# Patient Record
Sex: MALE | Race: WHITE | NOT HISPANIC OR LATINO | Employment: UNEMPLOYED | URBAN - METROPOLITAN AREA
[De-identification: names, ages, dates, MRNs, and addresses within clinical notes are randomized per-mention and may not be internally consistent; named-entity substitution may affect disease eponyms.]

---

## 2023-11-10 ENCOUNTER — CLINICAL SUPPORT (OUTPATIENT)
Dept: SLEEP MEDICINE | Facility: HOSPITAL | Age: 28
End: 2023-11-10
Payer: COMMERCIAL

## 2023-11-10 DIAGNOSIS — G47.33 OBSTRUCTIVE SLEEP APNEA (ADULT) (PEDIATRIC): ICD-10-CM

## 2023-11-10 DIAGNOSIS — R06.83 SNORING: ICD-10-CM

## 2023-11-10 PROCEDURE — 95806 SLEEP STUDY UNATT&RESP EFFT: CPT | Performed by: INTERNAL MEDICINE

## 2023-11-13 NOTE — PROGRESS NOTES
The patient received equipment and instructions for use of the [facility] Nomad HSAT device. The patient was instructed how to apply the effort belts, cannula, thermistor. It was also explained how the Nomad and oximeter components work.  The patient was asked to record their sleep for an 8-hour period.     The patient was informed of their responsibility for the device and acknowledged this by signing the HSAT device contract. The patient was asked to return the device on 11/13/2023 between the hours of 8am and Noon to the [facility] .     The patient was instructed to call 911 as usual for any medical- emergencies while at home.  The patient was also given a phone number for troubleshooting when using the device in case there were additional questions.

## 2024-02-07 ENCOUNTER — OFFICE VISIT (OUTPATIENT)
Dept: SLEEP MEDICINE | Facility: CLINIC | Age: 29
End: 2024-02-07
Payer: COMMERCIAL

## 2024-02-07 ENCOUNTER — APPOINTMENT (OUTPATIENT)
Dept: SLEEP MEDICINE | Facility: CLINIC | Age: 29
End: 2024-02-07
Payer: COMMERCIAL

## 2024-02-07 VITALS
HEART RATE: 115 BPM | WEIGHT: 160 LBS | BODY MASS INDEX: 25.11 KG/M2 | OXYGEN SATURATION: 95 % | DIASTOLIC BLOOD PRESSURE: 68 MMHG | SYSTOLIC BLOOD PRESSURE: 134 MMHG | HEIGHT: 67 IN

## 2024-02-07 DIAGNOSIS — G47.33 OBSTRUCTIVE SLEEP APNEA (ADULT) (PEDIATRIC): Primary | ICD-10-CM

## 2024-02-07 DIAGNOSIS — F20.0 PARANOID SCHIZOPHRENIA (MULTI): ICD-10-CM

## 2024-02-07 DIAGNOSIS — F17.200 TOBACCO USE DISORDER: ICD-10-CM

## 2024-02-07 DIAGNOSIS — J31.0 CHRONIC RHINITIS: ICD-10-CM

## 2024-02-07 DIAGNOSIS — J35.1 TONSILLAR HYPERTROPHY: ICD-10-CM

## 2024-02-07 PROCEDURE — 99214 OFFICE O/P EST MOD 30 MIN: CPT | Performed by: INTERNAL MEDICINE

## 2024-02-07 RX ORDER — FLUOXETINE HYDROCHLORIDE 20 MG/1
20 CAPSULE ORAL NIGHTLY
COMMUNITY

## 2024-02-07 RX ORDER — ARIPIPRAZOLE 960 MG/3.2ML
INJECTION, SUSPENSION, EXTENDED RELEASE INTRAMUSCULAR
COMMUNITY

## 2024-02-07 ASSESSMENT — PAIN SCALES - GENERAL: PAINLEVEL: 0-NO PAIN

## 2024-02-07 NOTE — PATIENT INSTRUCTIONS
Call  the  Sleep Center ([phone]) to speak with a sleep testing center  to book your overnight sleep study procedure at one of our adult and pediatric-friendly sleep labs. Overnight sleep studies may be scheduled on a weekday or weekend.      We have child-life services on a case-by-case basis at the [facility] location. We also perform daytime testing for shift workers on a case by case basis.     For the study: Bring usual medications and nightly routine items for your sleep study.  You may wish to bring a snack and nightly routine items you feel would be important to help you sleep (e.g. favorite pillow). Bring your sleep logs/requested paperwork to your sleep study appointment.     Results of your sleep study will be given to the ordering clinician. Please contact their office for results or followup as directed by your clinician. For additional information about the sleep medicine services, please call [phone].     Locations for sleep studies are [facility] ([facility]), [facility], [place], [place], [place], [place], [place], and [place].

## 2024-02-07 NOTE — ASSESSMENT & PLAN NOTE
History of severe YULIYA with severe hypoxia  Recommend in lab titration    We will follow-up with management options once testing is completed  Jhonatan verbalized understanding  Recommended follow-up 3-4 weeks after testing to discuss results and treatment options

## 2024-02-07 NOTE — PROGRESS NOTES
Subjective   Patient ID: Jhonatan Huffman is a 28 y.o. male who presents for a sleep evaluation with concerns of Review Sleep Study Results.  HPI     Prior sleep study results: 11/10/2023: HSAT: AHI 3% 86, AHI 4% 82, JACINTO 3.9.  There is no significant reduction with fine sleep.  SpO2 edy 62%.  Oxygen saturation less than 88% for 193 minutes severe hypoxia.  Recommendation for in lab titration     Sleep schedule  on weekdays / work days:  Usual Bedtime 10 PM  Falls asleep around 10 PM  Wake time 10 AM  Total sleep time average is 12 hours/day  Naps yes for 30 minutes to 1 hour    Sleep schedule  on weekends/non work days : Same    Preferred sleeping position: side lying    Review of Systems    snoring, witnessed apneas, nocturnal awakenings, waking up gasping or choking for air, nocturia, waking up sweaty/night sweats, experience heartburn or sour taste in mouth at night, sore throat in the morning, and dry mouth in the morning  DAYTIME SYMPTOMS: reports and irritability during the day    SCREENING FOR SLEEP DISORDERS:    MOVEMENT DISORDER SYMPTOMS:    - Sensations: Patient does not have unusual sensations in their extremities that cause an urge to move them     DENIES  dreams upon falling asleep  hypnagogic/hypnopompic hallucinations  sleep paralysis  symptoms of cataplexy  sleep walking  kicking, punching, or acting out dreams    ESS 13  NIALL 16  FOSQ 31    History reviewed. No pertinent past medical history.   Patient Active Problem List   Diagnosis    Obstructive sleep apnea (adult) (pediatric)    Chronic rhinitis    Tonsillar hypertrophy    Paranoid schizophrenia (CMS/HCC)    Tobacco use disorder      History reviewed. No pertinent surgical history.     Current Outpatient Medications:     Abilify Asimtufii 960 mg/3.2 mL suspension,extended rel syring, Inject 960 mg into the muscle every 8 (eight) weeks, Disp: , Rfl:     FLUoxetine (PROzac) 20 mg capsule, Take 1 capsule (20 mg) by mouth once daily., Disp: , Rfl:   "  No Known Allergies   Social History     Socioeconomic History    Marital status: Single     Spouse name: Not on file    Number of children: Not on file    Years of education: Not on file    Highest education level: Not on file   Occupational History    Not on file   Tobacco Use    Smoking status: Every Day     Packs/day: 1.00     Years: 8.00     Additional pack years: 0.00     Total pack years: 8.00     Types: Cigarettes    Smokeless tobacco: Never   Substance and Sexual Activity    Alcohol use: Not Currently    Drug use: Not Currently     Types: Cocaine, Marijuana, LSD, Oxycodone, Psilocybin    Sexual activity: Not Currently   Other Topics Concern    Not on file   Social History Narrative    Not on file     Social Determinants of Health     Financial Resource Strain: Not on file   Food Insecurity: Not on file   Transportation Needs: Not on file   Physical Activity: Not on file   Stress: Not on file   Social Connections: Not on file   Intimate Partner Violence: Not on file   Housing Stability: Not on file      SOCIAL HISTORY : reports nicotine use  denies alcohol  reports caffeine use  denies marijuana use  History of polysubstance abuse which he reports that he stopped a few years ago    No family history on file.      No results found for: \"IRON\", \"TIBC\", \"FERRITIN\"     Objective   /68   Pulse (!) 115   Ht 1.702 m (5' 7\")   Wt 72.6 kg (160 lb)   SpO2 95%   BMI 25.06 kg/m²    Physical Exam  PHYSICAL EXAM: GENERAL: alert pleasant and cooperative no acute distress  edematous nasal turbinates  MODIFIED ALFORD SCORE: I  MODIFIED MALLAMPATI SCORE: II (hard and soft palate, upper portion of tonsils anduvula visible)  midline and edematous  TONSILLAR SCORE: 3+  2+ collapse of the lateral pharyngeal wall  TONGUE SCALLOPING: without scalloping midline  PSYCH EXAM: alert,oriented, in NAD with a full range of affect, normal behavior and no psychotic features  Assessment/Plan   Problem List Items Addressed This " Visit             ICD-10-CM    Obstructive sleep apnea (adult) (pediatric) - Primary G47.33     History of severe YULIYA with severe hypoxia  Recommend in lab titration    We will follow-up with management options once testing is completed  Jhonatan verbalized understanding  Recommended follow-up 3-4 weeks after testing to discuss results and treatment options          Relevant Orders    In-Center Sleep Study (Sleep Provider Only)    Chronic rhinitis J31.0     May require nasal steroid         Tonsillar hypertrophy J35.1     If he requires high PAP settings, then he may require an ENT evaluation for debulking surgery         Paranoid schizophrenia (CMS/HCC) F20.0     Currently on abilify which can cause weight gain and exacerbate YULIYA         Tobacco use disorder F17.200     Discussed smoking cessation, offered assistance, he declined

## 2024-02-22 ENCOUNTER — CLINICAL SUPPORT (OUTPATIENT)
Dept: SLEEP MEDICINE | Facility: CLINIC | Age: 29
End: 2024-02-22
Payer: COMMERCIAL

## 2024-02-22 VITALS
DIASTOLIC BLOOD PRESSURE: 81 MMHG | HEART RATE: 91 BPM | RESPIRATION RATE: 12 BRPM | SYSTOLIC BLOOD PRESSURE: 132 MMHG | OXYGEN SATURATION: 97 % | HEIGHT: 67 IN | WEIGHT: 160 LBS | BODY MASS INDEX: 25.11 KG/M2

## 2024-02-22 DIAGNOSIS — G47.39 OTHER SLEEP APNEA: ICD-10-CM

## 2024-02-22 DIAGNOSIS — G47.33 OBSTRUCTIVE SLEEP APNEA (ADULT) (PEDIATRIC): ICD-10-CM

## 2024-02-22 PROCEDURE — 95811 POLYSOM 6/>YRS CPAP 4/> PARM: CPT | Performed by: INTERNAL MEDICINE

## 2024-02-22 ASSESSMENT — SLEEP AND FATIGUE QUESTIONNAIRES: ESS TOTAL SCORE: 11

## 2024-02-23 ASSESSMENT — SLEEP AND FATIGUE QUESTIONNAIRES
HOW LIKELY ARE YOU TO NOD OFF OR FALL ASLEEP WHILE LYING DOWN TO REST IN THE AFTERNOON WHEN CIRCUMSTANCES PERMIT: HIGH CHANCE OF DOZING
HOW LIKELY ARE YOU TO NOD OFF OR FALL ASLEEP IN A CAR, WHILE STOPPED FOR A FEW MINUTES IN TRAFFIC: SLIGHT CHANCE OF DOZING
HOW LIKELY ARE YOU TO NOD OFF OR FALL ASLEEP WHILE SITTING AND READING: WOULD NEVER DOZE
HOW LIKELY ARE YOU TO NOD OFF OR FALL ASLEEP WHILE SITTING AND TALKING TO SOMEONE: WOULD NEVER DOZE
SITING INACTIVE IN A PUBLIC PLACE LIKE A CLASS ROOM OR A MOVIE THEATER: MODERATE CHANCE OF DOZING
HOW LIKELY ARE YOU TO NOD OFF OR FALL ASLEEP WHILE WATCHING TV: SLIGHT CHANCE OF DOZING
ESS-CHAD TOTAL SCORE: 11
HOW LIKELY ARE YOU TO NOD OFF OR FALL ASLEEP WHEN YOU ARE A PASSENGER IN A CAR FOR AN HOUR WITHOUT A BREAK: MODERATE CHANCE OF DOZING
HOW LIKELY ARE YOU TO NOD OFF OR FALL ASLEEP WHILE SITTING QUIETLY AFTER LUNCH WITHOUT ALCOHOL: MODERATE CHANCE OF DOZING

## 2024-02-23 NOTE — PROGRESS NOTES
[facility] TECH NOTE:     Patient: Jhonatan Huffman   MRN//AGE: [id number]  1995  28 y.o.   Technologist: Neelam Aguila   Room: 106   Service Date: 2024        Sleep Testing Location: [facility]:     TECHNOLOGIST SLEEP STUDY PROCEDURE NOTE:   This sleep study is being conducted according to the policies and procedures outlined by the AAS accreditation standards.  The sleep study procedure and processes involved during this appointment was explained to the patient/patient’s family, questions were answered. The patient/family verbalized understanding.      The patient is a 28 y.o. year old male scheduled for a CPAP titration.  He arrived for his appointment.      The study that was ultimately completed was a Bilevel ST titration with a back-up rate of 8.     The full study Was completed.  Patient questionnaires completed?: yes     Consents signed? yes    Initial Fall Risk Screening:     Jhonatan has not fallen in the last 6 months. Jhonatan does not have a fear of falling. He does not need assistance with sitting, standing, or walking. He does not need assistance walking in his home. He does not need assistance in an unfamiliar setting. The patient is notusing an assistive device.     Brief Study observations: 28 year old male presents for a CPAP titration. Patient was acclimated to CPAP for 20 minutes prior to hook-up this evening, using a ResMed AirFit N20 medium.  Patient seemed to have tolerated the mask fit and pressure well during this time.  Patient was started on CPAP at a pressure of cmH2O using the ResMed AirFit N20 medium.  Patient was switched to Bilevel ST at a pressure of 8/4 cmH2O with a back-up rate of 8, due to central apneas. The ending pressure was 15/6 cmH2O with a back-up rate of 8.  Patient seemed to have tolerated the chosen mask and pressure well.  Patient woke once during the night to use the restroom. NSR observed. All sleep stages observed.  Supine REM observed.      Deviation  to order/protocol and reason:      If PAP, which was preferred mask/pressure/mode: ResMed AirFit N20 medium.  Ending pressure Bilevel ST 15/6 cmH2O with back-up rate 8    Other:None    After the procedure, the patient/family was informed to ensure followup with ordering clinician for testing results.      Technologist: Neelam Aguila

## 2024-02-29 ENCOUNTER — APPOINTMENT (OUTPATIENT)
Dept: RADIOLOGY | Facility: HOSPITAL | Age: 29
End: 2024-02-29
Payer: COMMERCIAL

## 2024-02-29 ENCOUNTER — HOSPITAL ENCOUNTER (EMERGENCY)
Facility: HOSPITAL | Age: 29
Discharge: HOME | End: 2024-02-29
Attending: STUDENT IN AN ORGANIZED HEALTH CARE EDUCATION/TRAINING PROGRAM
Payer: COMMERCIAL

## 2024-02-29 VITALS
RESPIRATION RATE: 16 BRPM | DIASTOLIC BLOOD PRESSURE: 82 MMHG | BODY MASS INDEX: 41.36 KG/M2 | HEART RATE: 66 BPM | SYSTOLIC BLOOD PRESSURE: 140 MMHG | OXYGEN SATURATION: 97 % | HEIGHT: 67 IN | WEIGHT: 263.5 LBS | TEMPERATURE: 97.8 F

## 2024-02-29 DIAGNOSIS — R11.2 NAUSEA AND VOMITING, UNSPECIFIED VOMITING TYPE: ICD-10-CM

## 2024-02-29 DIAGNOSIS — R10.84 ABDOMINAL PAIN, GENERALIZED: Primary | ICD-10-CM

## 2024-02-29 LAB
ALBUMIN SERPL-MCNC: 4.5 G/DL (ref 3.5–5)
ALP BLD-CCNC: 83 U/L (ref 35–125)
ALT SERPL-CCNC: 45 U/L (ref 5–40)
ANION GAP SERPL CALC-SCNC: 11 MMOL/L
APPEARANCE UR: CLEAR
AST SERPL-CCNC: 30 U/L (ref 5–40)
BASOPHILS # BLD AUTO: 0.04 X10*3/UL (ref 0–0.1)
BASOPHILS NFR BLD AUTO: 0.4 %
BILIRUB SERPL-MCNC: 0.3 MG/DL (ref 0.1–1.2)
BILIRUB UR STRIP.AUTO-MCNC: NEGATIVE MG/DL
BUN SERPL-MCNC: 14 MG/DL (ref 8–25)
CALCIUM SERPL-MCNC: 9.5 MG/DL (ref 8.5–10.4)
CHLORIDE SERPL-SCNC: 103 MMOL/L (ref 97–107)
CO2 SERPL-SCNC: 25 MMOL/L (ref 24–31)
COLOR UR: YELLOW
CREAT SERPL-MCNC: 0.9 MG/DL (ref 0.4–1.6)
EGFRCR SERPLBLD CKD-EPI 2021: >90 ML/MIN/1.73M*2
EOSINOPHIL # BLD AUTO: 0.22 X10*3/UL (ref 0–0.7)
EOSINOPHIL NFR BLD AUTO: 2 %
ERYTHROCYTE [DISTWIDTH] IN BLOOD BY AUTOMATED COUNT: 12.8 % (ref 11.5–14.5)
GLUCOSE SERPL-MCNC: 105 MG/DL (ref 65–99)
GLUCOSE UR STRIP.AUTO-MCNC: NORMAL MG/DL
HCT VFR BLD AUTO: 44.9 % (ref 41–52)
HGB BLD-MCNC: 15.2 G/DL (ref 13.5–17.5)
IMM GRANULOCYTES # BLD AUTO: 0.05 X10*3/UL (ref 0–0.7)
IMM GRANULOCYTES NFR BLD AUTO: 0.5 % (ref 0–0.9)
KETONES UR STRIP.AUTO-MCNC: ABNORMAL MG/DL
LEUKOCYTE ESTERASE UR QL STRIP.AUTO: NEGATIVE
LIPASE SERPL-CCNC: 32 U/L (ref 16–63)
LYMPHOCYTES # BLD AUTO: 3.44 X10*3/UL (ref 1.2–4.8)
LYMPHOCYTES NFR BLD AUTO: 31.8 %
MCH RBC QN AUTO: 26.6 PG (ref 26–34)
MCHC RBC AUTO-ENTMCNC: 33.9 G/DL (ref 32–36)
MCV RBC AUTO: 79 FL (ref 80–100)
MONOCYTES # BLD AUTO: 0.64 X10*3/UL (ref 0.1–1)
MONOCYTES NFR BLD AUTO: 5.9 %
MUCOUS THREADS #/AREA URNS AUTO: NORMAL /LPF
NEUTROPHILS # BLD AUTO: 6.44 X10*3/UL (ref 1.2–7.7)
NEUTROPHILS NFR BLD AUTO: 59.4 %
NITRITE UR QL STRIP.AUTO: NEGATIVE
NRBC BLD-RTO: 0 /100 WBCS (ref 0–0)
PH UR STRIP.AUTO: 5.5 [PH]
PLATELET # BLD AUTO: 279 X10*3/UL (ref 150–450)
POTASSIUM SERPL-SCNC: 3.9 MMOL/L (ref 3.4–5.1)
PROT SERPL-MCNC: 7.6 G/DL (ref 5.9–7.9)
PROT UR STRIP.AUTO-MCNC: ABNORMAL MG/DL
RBC # BLD AUTO: 5.72 X10*6/UL (ref 4.5–5.9)
RBC # UR STRIP.AUTO: ABNORMAL /UL
RBC #/AREA URNS AUTO: NORMAL /HPF
SODIUM SERPL-SCNC: 139 MMOL/L (ref 133–145)
SP GR UR STRIP.AUTO: 1.03
UROBILINOGEN UR STRIP.AUTO-MCNC: NORMAL MG/DL
WBC # BLD AUTO: 10.8 X10*3/UL (ref 4.4–11.3)
WBC #/AREA URNS AUTO: NORMAL /HPF

## 2024-02-29 PROCEDURE — 96374 THER/PROPH/DIAG INJ IV PUSH: CPT

## 2024-02-29 PROCEDURE — 2550000001 HC RX 255 CONTRASTS: Performed by: STUDENT IN AN ORGANIZED HEALTH CARE EDUCATION/TRAINING PROGRAM

## 2024-02-29 PROCEDURE — 83690 ASSAY OF LIPASE: CPT | Performed by: STUDENT IN AN ORGANIZED HEALTH CARE EDUCATION/TRAINING PROGRAM

## 2024-02-29 PROCEDURE — 99284 EMERGENCY DEPT VISIT MOD MDM: CPT | Mod: 25

## 2024-02-29 PROCEDURE — 2500000004 HC RX 250 GENERAL PHARMACY W/ HCPCS (ALT 636 FOR OP/ED): Performed by: STUDENT IN AN ORGANIZED HEALTH CARE EDUCATION/TRAINING PROGRAM

## 2024-02-29 PROCEDURE — 74177 CT ABD & PELVIS W/CONTRAST: CPT | Mod: FOREIGN READ | Performed by: RADIOLOGY

## 2024-02-29 PROCEDURE — 84075 ASSAY ALKALINE PHOSPHATASE: CPT | Performed by: STUDENT IN AN ORGANIZED HEALTH CARE EDUCATION/TRAINING PROGRAM

## 2024-02-29 PROCEDURE — 81001 URINALYSIS AUTO W/SCOPE: CPT | Performed by: STUDENT IN AN ORGANIZED HEALTH CARE EDUCATION/TRAINING PROGRAM

## 2024-02-29 PROCEDURE — 85025 COMPLETE CBC W/AUTO DIFF WBC: CPT | Performed by: STUDENT IN AN ORGANIZED HEALTH CARE EDUCATION/TRAINING PROGRAM

## 2024-02-29 PROCEDURE — 74177 CT ABD & PELVIS W/CONTRAST: CPT

## 2024-02-29 PROCEDURE — 36415 COLL VENOUS BLD VENIPUNCTURE: CPT | Performed by: STUDENT IN AN ORGANIZED HEALTH CARE EDUCATION/TRAINING PROGRAM

## 2024-02-29 RX ORDER — ONDANSETRON 4 MG/1
4 TABLET, ORALLY DISINTEGRATING ORAL EVERY 8 HOURS PRN
Qty: 10 TABLET | Refills: 0 | Status: SHIPPED | OUTPATIENT
Start: 2024-02-29 | End: 2024-03-07

## 2024-02-29 RX ORDER — ONDANSETRON HYDROCHLORIDE 2 MG/ML
4 INJECTION, SOLUTION INTRAVENOUS ONCE
Status: COMPLETED | OUTPATIENT
Start: 2024-02-29 | End: 2024-02-29

## 2024-02-29 RX ADMIN — ONDANSETRON 4 MG: 2 INJECTION INTRAMUSCULAR; INTRAVENOUS at 03:53

## 2024-02-29 RX ADMIN — IOHEXOL 75 ML: 350 INJECTION, SOLUTION INTRAVENOUS at 03:06

## 2024-02-29 ASSESSMENT — PAIN SCALES - GENERAL: PAINLEVEL_OUTOF10: 6

## 2024-02-29 ASSESSMENT — PAIN DESCRIPTION - LOCATION: LOCATION: ABDOMEN

## 2024-02-29 ASSESSMENT — COLUMBIA-SUICIDE SEVERITY RATING SCALE - C-SSRS
2. HAVE YOU ACTUALLY HAD ANY THOUGHTS OF KILLING YOURSELF?: NO
6. HAVE YOU EVER DONE ANYTHING, STARTED TO DO ANYTHING, OR PREPARED TO DO ANYTHING TO END YOUR LIFE?: NO
1. IN THE PAST MONTH, HAVE YOU WISHED YOU WERE DEAD OR WISHED YOU COULD GO TO SLEEP AND NOT WAKE UP?: NO

## 2024-02-29 ASSESSMENT — PAIN DESCRIPTION - PAIN TYPE: TYPE: ACUTE PAIN

## 2024-02-29 ASSESSMENT — PAIN DESCRIPTION - DESCRIPTORS
DESCRIPTORS: THROBBING;DISCOMFORT
DESCRIPTORS: CRAMPING;THROBBING

## 2024-02-29 ASSESSMENT — PAIN DESCRIPTION - ORIENTATION: ORIENTATION: RIGHT;LEFT;MID

## 2024-02-29 ASSESSMENT — PAIN DESCRIPTION - PROGRESSION: CLINICAL_PROGRESSION: GRADUALLY IMPROVING

## 2024-02-29 ASSESSMENT — PAIN - FUNCTIONAL ASSESSMENT: PAIN_FUNCTIONAL_ASSESSMENT: 0-10

## 2024-02-29 ASSESSMENT — PAIN DESCRIPTION - FREQUENCY: FREQUENCY: CONSTANT/CONTINUOUS

## 2024-02-29 NOTE — ED PROVIDER NOTES
HPI   Chief Complaint   Patient presents with    Abdominal Pain     ABD pain started at 2200/2300 lastnight while pt was laying down. Pt has experienced this pain before with kidney stone. Pt wants to confirm kidney stones if it is cause. No visual blood in urine, no painful urination, afebrile.       HPI  See [facility]                  [place] Coma Scale Score: 15                     Patient History   No past medical history on file.  No past surgical history on file.  No family history on file.  Social History     Tobacco Use    Smoking status: Every Day     Packs/day: 1.00     Years: 8.00     Additional pack years: 0.00     Total pack years: 8.00     Types: Cigarettes    Smokeless tobacco: Never   Substance Use Topics    Alcohol use: Not Currently    Drug use: Not Currently     Types: Cocaine, Marijuana, LSD, Oxycodone, Psilocybin       Physical Exam   ED Triage Vitals [02/29/24 0122]   Temperature Heart Rate Respirations BP   36.6 °C (97.8 °F) 73 18 (!) 165/105      Pulse Ox Temp Source Heart Rate Source Patient Position   99 % Tympanic Monitor Sitting      BP Location FiO2 (%)     Right arm --       Physical Exam  See [facility]  ED Course & [facility]   Diagnoses as of 02/29/24 0351   Abdominal pain, generalized   Nausea and vomiting, unspecified vomiting type       Medical Decision Making  28-year-old male without significant past medical history presents emergency room with abdominal pain.  Patient notes this started around 10 or 11 PM this evening and has been coming on and off.  Nothing makes it better or worse and patient attempted taking naproxen at home but had an episode of emesis immediately after.  Patient does not note any significant nausea currently and otherwise denies any urinary complaints such as pain on urination or hematuria and otherwise notes normal bowel movements.  Patient denies any significant bloody stools or diarrhea and otherwise denies recent fevers, chills, cough, congestion, shortness of breath,  chest pain.    ED Triage Vitals [02/29/24 0122]   Temperature Heart Rate Respirations BP   36.6 °C (97.8 °F) 73 18 (!) 165/105      Pulse Ox Temp Source Heart Rate Source Patient Position   99 % Tympanic Monitor Sitting      BP Location FiO2 (%)     Right arm --       Vital signs reviewed: Afebrile, nontachycardic, mildly hypertensive, 99% on room air    Exam     Constitutional: No acute distress. Resting comfortably.   Head: Normocephalic, atraumatic.   Eyes: Pupils equal bilaterally, EOM grossly intact, conjunctiva normal.  Mouth/Throat: Oropharynx is clear, moist mucus membranes.   Neck: Supple. No lymphadenopathy.  Cardiovascular: Regular rate and regular rhythm. Extremities are well-perfused.   Pulmonary/Chest: No respiratory distress, breathing comfortably on room air.    Abdominal: Soft, non-tender, non-distended. No rebound or guarding.   Musculoskeletal: No lower extremity edema.       Skin: Warm, dry, and intact.   Neurological: Patient is oriented to person, place, time, and situation. Face symmetric, hearing intact to voice, speech normal. Moves all extremities.   Psych: Mood, affect, thought content, and judgment normal.    Differential includes but is not limited to:  Nephrolithiasis versus UTI versus pancreatitis versus viral illness    Amount and/or Complexity of Data Reviewed  External Data Reviewed: Outpatient Labs and/or studies CT scan from 3/16/2023 with reported 3 mm calculus and left lower pole but no evidence of obstructive uropathy .      Labs: ordered.  Labs Reviewed   CBC WITH AUTO DIFFERENTIAL - Abnormal       Result Value    WBC 10.8      nRBC 0.0      RBC 5.72      Hemoglobin 15.2      Hematocrit 44.9      MCV 79 (*)     MCH 26.6      MCHC 33.9      RDW 12.8      Platelets 279      Neutrophils % 59.4      Immature Granulocytes %, Automated 0.5      Lymphocytes % 31.8      Monocytes % 5.9      Eosinophils % 2.0      Basophils % 0.4      Neutrophils Absolute 6.44      Immature  Granulocytes Absolute, Automated 0.05      Lymphocytes Absolute 3.44      Monocytes Absolute 0.64      Eosinophils Absolute 0.22      Basophils Absolute 0.04     COMPREHENSIVE METABOLIC PANEL - Abnormal    Glucose 105 (*)     Sodium 139      Potassium 3.9      Chloride 103      Bicarbonate 25      Urea Nitrogen 14      Creatinine 0.90      eGFR >90      Calcium 9.5      Albumin 4.5      Alkaline Phosphatase 83      Total Protein 7.6      AST 30      Bilirubin, Total 0.3      ALT 45 (*)     Anion Gap 11     URINALYSIS WITH REFLEX CULTURE AND MICROSCOPIC - Abnormal    Color, Urine Yellow      Appearance, Urine Clear      Specific Gravity, Urine 1.033      pH, Urine 5.5      Protein, Urine 20 (TRACE)      Glucose, Urine Normal      Blood, Urine 0.03 (TRACE) (*)     Ketones, Urine TRACE (*)     Bilirubin, Urine NEGATIVE      Urobilinogen, Urine Normal      Nitrite, Urine NEGATIVE      Leukocyte Esterase, Urine NEGATIVE     LIPASE - Normal    Lipase 32     URINALYSIS WITH REFLEX CULTURE AND MICROSCOPIC    Narrative:     The following orders were created for panel order Urinalysis with Reflex Culture and Microscopic.  Procedure                               Abnormality         Status                     ---------                               -----------         ------                     Urinalysis with Reflex Cu...[[id number]]  Abnormal            Final result               Extra Urine Gray Tube[[id number]]                                                          Please view results for these tests on the individual orders.   EXTRA URINE GRAY TUBE   URINALYSIS MICROSCOPIC WITH REFLEX CULTURE    WBC, Urine 1-5      RBC, Urine 1-2      Mucus, Urine 1+         Radiology: ordered and independent interpretation performed.  CT Scan(s) CT abdomen pelvis is interpreted by me shows no large dilated loops of bowel to suggest bowel obstruction    ECG/medicine tests: ordered and independent interpretation performed.  Diagnoses as of  02/29/24 0351   Abdominal pain, generalized   Nausea and vomiting, unspecified vomiting type     Lab work as interpreted by me shows no significant leukocytosis or anemia on CBC and otherwise comprehensive metabolic panel shows no significant electrolyte abnormality or LFT abnormality.  Urinalysis shows small blood but otherwise no evidence of UTI.  CT abdomen pelvis per radiology with no obstructing stone or acute pathology at this time.    Patient notes improvement of his symptoms and this possibly represents the beginning of a viral infection versus viral gastroenteritis.  Patient otherwise tolerating oral here in the emergency room and will discharge with Zofran and continue supportive care at home.    PLAN AND FOLLOW-UP: Patient counseled on all findings, diagnosis and treatment plan. Patient's questions and concerns addressed. Patient stable, discharged with instructions to follow up with PMD, and to return to ED at any time for worsening symptoms or any other concerns. Patient demonstrates understanding of the findings and the importance of appropriate follow up care.    ED Medications managed:    Medications   ondansetron (Zofran) injection 4 mg (has no administration in time range)   iohexol (OMNIPaque) 350 mg iodine/mL solution 75 mL (75 mL intravenous Given 2/29/24 0306)         PATIENT REFERRED TO:  AMIRA Sharpe-[address] [address] [phone]            DISCHARGE MEDICATIONS:  New Prescriptions    ONDANSETRON ODT (ZOFRAN-ODT) 4 MG DISINTEGRATING TABLET    Take 1 tablet (4 mg) by mouth every 8 hours if needed for nausea or vomiting for up to 7 days.       Chon Bonner MD  3:51 AM    Attending Emergency Physician  [facility]            Procedure  Procedures     Chon Bonner MD  02/29/24 0352

## 2024-03-11 ENCOUNTER — OFFICE VISIT (OUTPATIENT)
Dept: SLEEP MEDICINE | Facility: CLINIC | Age: 29
End: 2024-03-11
Payer: COMMERCIAL

## 2024-03-11 VITALS
BODY MASS INDEX: 40.81 KG/M2 | HEART RATE: 89 BPM | DIASTOLIC BLOOD PRESSURE: 70 MMHG | SYSTOLIC BLOOD PRESSURE: 114 MMHG | OXYGEN SATURATION: 97 % | WEIGHT: 260 LBS | HEIGHT: 67 IN

## 2024-03-11 DIAGNOSIS — G47.31 COMPLEX SLEEP APNEA SYNDROME: Primary | ICD-10-CM

## 2024-03-11 DIAGNOSIS — G47.10 HYPERSOMNOLENCE DISORDER: ICD-10-CM

## 2024-03-11 PROBLEM — G47.39 COMPLEX SLEEP APNEA SYNDROME: Status: ACTIVE | Noted: 2024-02-07

## 2024-03-11 PROCEDURE — 99214 OFFICE O/P EST MOD 30 MIN: CPT | Performed by: INTERNAL MEDICINE

## 2024-03-11 ASSESSMENT — SLEEP AND FATIGUE QUESTIONNAIRES
SITING INACTIVE IN A PUBLIC PLACE LIKE A CLASS ROOM OR A MOVIE THEATER: HIGH CHANCE OF DOZING
HOW LIKELY ARE YOU TO NOD OFF OR FALL ASLEEP WHILE SITTING AND TALKING TO SOMEONE: WOULD NEVER DOZE
HOW LIKELY ARE YOU TO NOD OFF OR FALL ASLEEP WHILE LYING DOWN TO REST IN THE AFTERNOON WHEN CIRCUMSTANCES PERMIT: HIGH CHANCE OF DOZING
HOW LIKELY ARE YOU TO NOD OFF OR FALL ASLEEP WHILE SITTING QUIETLY AFTER LUNCH WITHOUT ALCOHOL: MODERATE CHANCE OF DOZING
HOW LIKELY ARE YOU TO NOD OFF OR FALL ASLEEP WHILE SITTING AND READING: MODERATE CHANCE OF DOZING
ESS-CHAD TOTAL SCORE: 16
HOW LIKELY ARE YOU TO NOD OFF OR FALL ASLEEP IN A CAR, WHILE STOPPED FOR A FEW MINUTES IN TRAFFIC: WOULD NEVER DOZE
HOW LIKELY ARE YOU TO NOD OFF OR FALL ASLEEP WHEN YOU ARE A PASSENGER IN A CAR FOR AN HOUR WITHOUT A BREAK: HIGH CHANCE OF DOZING
HOW LIKELY ARE YOU TO NOD OFF OR FALL ASLEEP WHILE WATCHING TV: HIGH CHANCE OF DOZING

## 2024-03-11 ASSESSMENT — PAIN SCALES - GENERAL: PAINLEVEL: 0-NO PAIN

## 2024-03-11 NOTE — PROGRESS NOTES
"  Subjective   Patient ID: Jhonatan Huffman is a 28 y.o. male who presents for Review Sleep Study Results.  HPI    Prior Sleep History:  Prior sleep study results: 11/10/2023: HSAT: AHI 3% 86, AHI 4% 82, JACINTO 3.9.  There is no significant reduction with fine sleep.  SpO2 edy 62%.  Oxygen saturation less than 88% for 193 minutes severe hypoxia.  Recommendation for in lab titration   2/7/2024:Office Visit: Dr. Mello Rodas: Sleep symptoms of snoring, witnessed apneas, nocturnal awakenings, waking up gasping or choking for air, nocturia, waking up sweaty/night sweats, experience heartburn or sour taste in mouth at night, sore throat in the morning, and dry mouth in the morning  DAYTIME SYMPTOMS: reports and irritability during the day.  Ordered in lab titration due to severity.  Discussed chronic rhinitis.  Discussed tonsillar pressure if he may require ENT evaluation.  Parents schizophrenia on Abilify which can cause weight gain.  Tobacco use disorder recommended cessation offered assistance    Current Sleep History:  2/22/2024: PAP titration.  Patient initially tried on CPAP.  Switched to bilevel ST due to central apneas.  Successful titration with bilevel PAP ST 12/6 cm H2O with backup rate of 8 due to treatment emergent centrals    ESS: 16     Review of Systems  Review of systems negative except as per HPI  Objective   /70   Pulse 89   Ht 1.702 m (5' 7\")   Wt 118 kg (260 lb)   SpO2 97%   BMI 40.72 kg/m²    PREVIOUS WEIGHTS:  Wt Readings from Last 3 Encounters:   03/11/24 118 kg (260 lb)   02/29/24 120 kg (263 lb 8 oz)   02/22/24 72.6 kg (160 lb)     Physical Exam  PHYSICAL EXAM: GENERAL: alert pleasant and cooperative no acute distress    No results found for: \"IRON\", \"TIBC\", \"FERRITIN\"     Assessment/Plan   Problem List Items Addressed This Visit             ICD-10-CM    Complex sleep apnea syndrome - Primary G47.31     Order to [facility] for Bilevel PAP ST 12/6 cm H2O with rate 8. Residual AHI 5.5. He had " treatment emergent central apneas and hypopneas as pressure increased. Unfortunately all hypopnea in our software currently register as obstructive even if they are centrals, so the central apnea index is falsely reduced on the PSG report         Relevant Orders    Positive Airway Pressure (PAP) Therapy    Hypersomnolence disorder G47.10     Suspect multifactorial. Will reevaluate after PAP therapy

## 2024-03-11 NOTE — ASSESSMENT & PLAN NOTE
Order to [facility] for Bilevel PAP ST 12/6 cm H2O with rate 8. Residual AHI 5.5. He had treatment emergent central apneas and hypopneas as pressure increased. Unfortunately all hypopnea in our software currently register as obstructive even if they are centrals, so the central apnea index is falsely reduced on the PSG report

## 2024-03-26 DIAGNOSIS — G47.33 OBSTRUCTIVE SLEEP APNEA (ADULT) (PEDIATRIC): Primary | ICD-10-CM

## 2024-03-26 NOTE — PROGRESS NOTES
Reviewed and rescored study. Did not qualify for Bilevel ST. Changing order to Bilevel PAP 12/6 cm H2O, which the patient responded. Back-up rate not required at this time and will reconsider if centrals persist with compliance data report

## 2024-04-21 ENCOUNTER — APPOINTMENT (OUTPATIENT)
Dept: RADIOLOGY | Facility: HOSPITAL | Age: 29
DRG: 417 | End: 2024-04-21
Payer: COMMERCIAL

## 2024-04-21 ENCOUNTER — HOSPITAL ENCOUNTER (INPATIENT)
Facility: HOSPITAL | Age: 29
LOS: 5 days | Discharge: HOME | DRG: 417 | End: 2024-04-26
Attending: STUDENT IN AN ORGANIZED HEALTH CARE EDUCATION/TRAINING PROGRAM | Admitting: INTERNAL MEDICINE
Payer: COMMERCIAL

## 2024-04-21 DIAGNOSIS — K85.10 GALLSTONE PANCREATITIS (HHS-HCC): Primary | ICD-10-CM

## 2024-04-21 LAB
ALBUMIN SERPL-MCNC: 4.5 G/DL (ref 3.5–5)
ALP BLD-CCNC: 115 U/L (ref 35–125)
ALT SERPL-CCNC: 590 U/L (ref 5–40)
ANION GAP SERPL CALC-SCNC: 12 MMOL/L
APPEARANCE UR: CLEAR
AST SERPL-CCNC: 526 U/L (ref 5–40)
BASOPHILS # BLD AUTO: 0.05 X10*3/UL (ref 0–0.1)
BASOPHILS NFR BLD AUTO: 0.3 %
BILIRUB SERPL-MCNC: 2.8 MG/DL (ref 0.1–1.2)
BILIRUB UR STRIP.AUTO-MCNC: NEGATIVE MG/DL
BUN SERPL-MCNC: 11 MG/DL (ref 8–25)
CALCIUM SERPL-MCNC: 9.4 MG/DL (ref 8.5–10.4)
CHLORIDE SERPL-SCNC: 103 MMOL/L (ref 97–107)
CO2 SERPL-SCNC: 25 MMOL/L (ref 24–31)
COLOR UR: YELLOW
CREAT SERPL-MCNC: 0.9 MG/DL (ref 0.4–1.6)
EGFRCR SERPLBLD CKD-EPI 2021: >90 ML/MIN/1.73M*2
EOSINOPHIL # BLD AUTO: 0.32 X10*3/UL (ref 0–0.7)
EOSINOPHIL NFR BLD AUTO: 2 %
ERYTHROCYTE [DISTWIDTH] IN BLOOD BY AUTOMATED COUNT: 13.2 % (ref 11.5–14.5)
FLUAV RNA RESP QL NAA+PROBE: NOT DETECTED
FLUBV RNA RESP QL NAA+PROBE: NOT DETECTED
GLUCOSE SERPL-MCNC: 117 MG/DL (ref 65–99)
GLUCOSE UR STRIP.AUTO-MCNC: NORMAL MG/DL
HCT VFR BLD AUTO: 46.8 % (ref 41–52)
HGB BLD-MCNC: 16.1 G/DL (ref 13.5–17.5)
IMM GRANULOCYTES # BLD AUTO: 0.05 X10*3/UL (ref 0–0.7)
IMM GRANULOCYTES NFR BLD AUTO: 0.3 % (ref 0–0.9)
KETONES UR STRIP.AUTO-MCNC: NEGATIVE MG/DL
LEUKOCYTE ESTERASE UR QL STRIP.AUTO: NEGATIVE
LIPASE SERPL-CCNC: >3000 U/L (ref 16–63)
LYMPHOCYTES # BLD AUTO: 2.79 X10*3/UL (ref 1.2–4.8)
LYMPHOCYTES NFR BLD AUTO: 17.8 %
MCH RBC QN AUTO: 27 PG (ref 26–34)
MCHC RBC AUTO-ENTMCNC: 34.4 G/DL (ref 32–36)
MCV RBC AUTO: 78 FL (ref 80–100)
MONOCYTES # BLD AUTO: 0.93 X10*3/UL (ref 0.1–1)
MONOCYTES NFR BLD AUTO: 5.9 %
MUCOUS THREADS #/AREA URNS AUTO: NORMAL /LPF
NEUTROPHILS # BLD AUTO: 11.56 X10*3/UL (ref 1.2–7.7)
NEUTROPHILS NFR BLD AUTO: 73.7 %
NITRITE UR QL STRIP.AUTO: NEGATIVE
NRBC BLD-RTO: 0 /100 WBCS (ref 0–0)
PH UR STRIP.AUTO: 7.5 [PH]
PLATELET # BLD AUTO: 305 X10*3/UL (ref 150–450)
POTASSIUM SERPL-SCNC: 3.5 MMOL/L (ref 3.4–5.1)
PROT SERPL-MCNC: 7.6 G/DL (ref 5.9–7.9)
PROT UR STRIP.AUTO-MCNC: ABNORMAL MG/DL
RBC # BLD AUTO: 5.97 X10*6/UL (ref 4.5–5.9)
RBC # UR STRIP.AUTO: NEGATIVE /UL
RBC #/AREA URNS AUTO: NORMAL /HPF
SARS-COV-2 RNA RESP QL NAA+PROBE: NOT DETECTED
SODIUM SERPL-SCNC: 140 MMOL/L (ref 133–145)
SP GR UR STRIP.AUTO: >1.05
UROBILINOGEN UR STRIP.AUTO-MCNC: NORMAL MG/DL
WBC # BLD AUTO: 15.7 X10*3/UL (ref 4.4–11.3)
WBC #/AREA URNS AUTO: NORMAL /HPF

## 2024-04-21 PROCEDURE — 2550000001 HC RX 255 CONTRASTS: Performed by: PHYSICIAN ASSISTANT

## 2024-04-21 PROCEDURE — 96374 THER/PROPH/DIAG INJ IV PUSH: CPT | Mod: 59

## 2024-04-21 PROCEDURE — 81003 URINALYSIS AUTO W/O SCOPE: CPT | Performed by: PHYSICIAN ASSISTANT

## 2024-04-21 PROCEDURE — 85025 COMPLETE CBC W/AUTO DIFF WBC: CPT | Performed by: PHYSICIAN ASSISTANT

## 2024-04-21 PROCEDURE — 36415 COLL VENOUS BLD VENIPUNCTURE: CPT | Performed by: PHYSICIAN ASSISTANT

## 2024-04-21 PROCEDURE — 96375 TX/PRO/DX INJ NEW DRUG ADDON: CPT

## 2024-04-21 PROCEDURE — 96361 HYDRATE IV INFUSION ADD-ON: CPT

## 2024-04-21 PROCEDURE — 2500000002 HC RX 250 W HCPCS SELF ADMINISTERED DRUGS (ALT 637 FOR MEDICARE OP, ALT 636 FOR OP/ED): Performed by: INTERNAL MEDICINE

## 2024-04-21 PROCEDURE — 76705 ECHO EXAM OF ABDOMEN: CPT | Performed by: RADIOLOGY

## 2024-04-21 PROCEDURE — 2500000004 HC RX 250 GENERAL PHARMACY W/ HCPCS (ALT 636 FOR OP/ED): Performed by: NURSE PRACTITIONER

## 2024-04-21 PROCEDURE — 84478 ASSAY OF TRIGLYCERIDES: CPT | Performed by: PHYSICIAN ASSISTANT

## 2024-04-21 PROCEDURE — 83690 ASSAY OF LIPASE: CPT | Performed by: PHYSICIAN ASSISTANT

## 2024-04-21 PROCEDURE — 1100000001 HC PRIVATE ROOM DAILY

## 2024-04-21 PROCEDURE — S4991 NICOTINE PATCH NONLEGEND: HCPCS | Performed by: INTERNAL MEDICINE

## 2024-04-21 PROCEDURE — 74177 CT ABD & PELVIS W/CONTRAST: CPT | Performed by: RADIOLOGY

## 2024-04-21 PROCEDURE — 2500000001 HC RX 250 WO HCPCS SELF ADMINISTERED DRUGS (ALT 637 FOR MEDICARE OP)

## 2024-04-21 PROCEDURE — 87636 SARSCOV2 & INF A&B AMP PRB: CPT | Performed by: PHYSICIAN ASSISTANT

## 2024-04-21 PROCEDURE — 76705 ECHO EXAM OF ABDOMEN: CPT

## 2024-04-21 PROCEDURE — 96372 THER/PROPH/DIAG INJ SC/IM: CPT | Performed by: PHYSICIAN ASSISTANT

## 2024-04-21 PROCEDURE — 2500000004 HC RX 250 GENERAL PHARMACY W/ HCPCS (ALT 636 FOR OP/ED): Performed by: PHYSICIAN ASSISTANT

## 2024-04-21 PROCEDURE — 74177 CT ABD & PELVIS W/CONTRAST: CPT

## 2024-04-21 PROCEDURE — 2500000004 HC RX 250 GENERAL PHARMACY W/ HCPCS (ALT 636 FOR OP/ED): Performed by: INTERNAL MEDICINE

## 2024-04-21 PROCEDURE — 99285 EMERGENCY DEPT VISIT HI MDM: CPT | Mod: 25

## 2024-04-21 PROCEDURE — 80053 COMPREHEN METABOLIC PANEL: CPT | Performed by: PHYSICIAN ASSISTANT

## 2024-04-21 RX ORDER — FLUOXETINE HYDROCHLORIDE 20 MG/1
20 CAPSULE ORAL NIGHTLY
Status: DISCONTINUED | OUTPATIENT
Start: 2024-04-21 | End: 2024-04-26 | Stop reason: HOSPADM

## 2024-04-21 RX ORDER — FLUOXETINE HYDROCHLORIDE 20 MG/1
20 CAPSULE ORAL DAILY
Status: DISCONTINUED | OUTPATIENT
Start: 2024-04-21 | End: 2024-04-21

## 2024-04-21 RX ORDER — DICYCLOMINE HYDROCHLORIDE 10 MG/ML
20 INJECTION INTRAMUSCULAR ONCE
Status: COMPLETED | OUTPATIENT
Start: 2024-04-21 | End: 2024-04-21

## 2024-04-21 RX ORDER — DEXTROSE MONOHYDRATE, SODIUM CHLORIDE, SODIUM LACTATE, POTASSIUM CHLORIDE, CALCIUM CHLORIDE 5; 600; 310; 179; 20 G/100ML; MG/100ML; MG/100ML; MG/100ML; MG/100ML
125 INJECTION, SOLUTION INTRAVENOUS CONTINUOUS
Status: DISCONTINUED | OUTPATIENT
Start: 2024-04-21 | End: 2024-04-24

## 2024-04-21 RX ORDER — FAMOTIDINE 10 MG/ML
20 INJECTION INTRAVENOUS EVERY 12 HOURS SCHEDULED
Status: DISCONTINUED | OUTPATIENT
Start: 2024-04-21 | End: 2024-04-24

## 2024-04-21 RX ORDER — CEFTRIAXONE 1 G/50ML
1 INJECTION, SOLUTION INTRAVENOUS ONCE
Status: COMPLETED | OUTPATIENT
Start: 2024-04-21 | End: 2024-04-21

## 2024-04-21 RX ORDER — KETOROLAC TROMETHAMINE 30 MG/ML
30 INJECTION, SOLUTION INTRAMUSCULAR; INTRAVENOUS EVERY 6 HOURS SCHEDULED
Status: COMPLETED | OUTPATIENT
Start: 2024-04-21 | End: 2024-04-22

## 2024-04-21 RX ORDER — MORPHINE SULFATE 4 MG/ML
4 INJECTION, SOLUTION INTRAMUSCULAR; INTRAVENOUS ONCE
Status: COMPLETED | OUTPATIENT
Start: 2024-04-21 | End: 2024-04-21

## 2024-04-21 RX ORDER — IBUPROFEN 200 MG
1 TABLET ORAL DAILY
Status: DISCONTINUED | OUTPATIENT
Start: 2024-04-21 | End: 2024-04-26 | Stop reason: HOSPADM

## 2024-04-21 RX ORDER — KETOROLAC TROMETHAMINE 30 MG/ML
15 INJECTION, SOLUTION INTRAMUSCULAR; INTRAVENOUS ONCE
Status: COMPLETED | OUTPATIENT
Start: 2024-04-21 | End: 2024-04-21

## 2024-04-21 RX ORDER — ONDANSETRON HYDROCHLORIDE 2 MG/ML
4 INJECTION, SOLUTION INTRAVENOUS ONCE
Status: COMPLETED | OUTPATIENT
Start: 2024-04-21 | End: 2024-04-21

## 2024-04-21 RX ORDER — ONDANSETRON HYDROCHLORIDE 2 MG/ML
4 INJECTION, SOLUTION INTRAVENOUS EVERY 4 HOURS
Status: DISCONTINUED | OUTPATIENT
Start: 2024-04-21 | End: 2024-04-26 | Stop reason: HOSPADM

## 2024-04-21 RX ORDER — CEFTRIAXONE 1 G/50ML
1 INJECTION, SOLUTION INTRAVENOUS EVERY 24 HOURS
Status: DISCONTINUED | OUTPATIENT
Start: 2024-04-22 | End: 2024-04-26 | Stop reason: HOSPADM

## 2024-04-21 RX ADMIN — KETOROLAC TROMETHAMINE 15 MG: 30 INJECTION, SOLUTION INTRAMUSCULAR at 12:54

## 2024-04-21 RX ADMIN — CEFTRIAXONE SODIUM 1 G: 1 INJECTION, SOLUTION INTRAVENOUS at 16:41

## 2024-04-21 RX ADMIN — SODIUM CHLORIDE 1000 ML: 900 INJECTION, SOLUTION INTRAVENOUS at 12:54

## 2024-04-21 RX ADMIN — FAMOTIDINE 20 MG: 10 INJECTION, SOLUTION INTRAVENOUS at 21:17

## 2024-04-21 RX ADMIN — DICYCLOMINE HYDROCHLORIDE 20 MG: 20 INJECTION, SOLUTION INTRAMUSCULAR at 12:53

## 2024-04-21 RX ADMIN — ONDANSETRON HYDROCHLORIDE 4 MG: 2 INJECTION INTRAMUSCULAR; INTRAVENOUS at 18:21

## 2024-04-21 RX ADMIN — FLUOXETINE HYDROCHLORIDE 20 MG: 20 CAPSULE ORAL at 21:21

## 2024-04-21 RX ADMIN — HYDROMORPHONE HYDROCHLORIDE 0.4 MG: 1 INJECTION, SOLUTION INTRAMUSCULAR; INTRAVENOUS; SUBCUTANEOUS at 21:16

## 2024-04-21 RX ADMIN — KETOROLAC TROMETHAMINE 30 MG: 30 INJECTION INTRAMUSCULAR; INTRAVENOUS at 18:26

## 2024-04-21 RX ADMIN — Medication 1 PATCH: at 21:21

## 2024-04-21 RX ADMIN — ONDANSETRON HYDROCHLORIDE 4 MG: 2 INJECTION INTRAMUSCULAR; INTRAVENOUS at 21:17

## 2024-04-21 RX ADMIN — HYDROMORPHONE HYDROCHLORIDE 0.2 MG: 1 INJECTION, SOLUTION INTRAMUSCULAR; INTRAVENOUS; SUBCUTANEOUS at 18:21

## 2024-04-21 RX ADMIN — MORPHINE SULFATE 4 MG: 4 INJECTION, SOLUTION INTRAMUSCULAR; INTRAVENOUS at 14:38

## 2024-04-21 RX ADMIN — SODIUM CHLORIDE, SODIUM LACTATE, POTASSIUM CHLORIDE, AND CALCIUM CHLORIDE 1000 ML: 600; 310; 30; 20 INJECTION, SOLUTION INTRAVENOUS at 16:41

## 2024-04-21 RX ADMIN — IOHEXOL 75 ML: 350 INJECTION, SOLUTION INTRAVENOUS at 14:02

## 2024-04-21 RX ADMIN — ONDANSETRON 4 MG: 2 INJECTION INTRAMUSCULAR; INTRAVENOUS at 12:53

## 2024-04-21 RX ADMIN — DEXTROSE MONOHYDRATE, SODIUM CHLORIDE, SODIUM LACTATE, POTASSIUM CHLORIDE, CALCIUM CHLORIDE 125 ML/HR: 5; 600; 310; 179; 20 INJECTION, SOLUTION INTRAVENOUS at 20:03

## 2024-04-21 SDOH — SOCIAL STABILITY: SOCIAL INSECURITY: HAVE YOU HAD THOUGHTS OF HARMING ANYONE ELSE?: NO

## 2024-04-21 SDOH — SOCIAL STABILITY: SOCIAL INSECURITY: DO YOU FEEL ANYONE HAS EXPLOITED OR TAKEN ADVANTAGE OF YOU FINANCIALLY OR OF YOUR PERSONAL PROPERTY?: NO

## 2024-04-21 SDOH — SOCIAL STABILITY: SOCIAL INSECURITY: ABUSE: ADULT

## 2024-04-21 SDOH — SOCIAL STABILITY: SOCIAL INSECURITY: HAVE YOU HAD ANY THOUGHTS OF HARMING ANYONE ELSE?: NO

## 2024-04-21 SDOH — SOCIAL STABILITY: SOCIAL INSECURITY: DOES ANYONE TRY TO KEEP YOU FROM HAVING/CONTACTING OTHER FRIENDS OR DOING THINGS OUTSIDE YOUR HOME?: NO

## 2024-04-21 SDOH — SOCIAL STABILITY: SOCIAL INSECURITY: ARE THERE ANY APPARENT SIGNS OF INJURIES/BEHAVIORS THAT COULD BE RELATED TO ABUSE/NEGLECT?: NO

## 2024-04-21 SDOH — SOCIAL STABILITY: SOCIAL INSECURITY: ARE YOU OR HAVE YOU BEEN THREATENED OR ABUSED PHYSICALLY, EMOTIONALLY, OR SEXUALLY BY ANYONE?: NO

## 2024-04-21 SDOH — SOCIAL STABILITY: SOCIAL INSECURITY: WERE YOU ABLE TO COMPLETE ALL THE BEHAVIORAL HEALTH SCREENINGS?: YES

## 2024-04-21 SDOH — SOCIAL STABILITY: SOCIAL INSECURITY: HAS ANYONE EVER THREATENED TO HURT YOUR FAMILY OR YOUR PETS?: NO

## 2024-04-21 SDOH — SOCIAL STABILITY: SOCIAL INSECURITY: DO YOU FEEL UNSAFE GOING BACK TO THE PLACE WHERE YOU ARE LIVING?: NO

## 2024-04-21 ASSESSMENT — COGNITIVE AND FUNCTIONAL STATUS - GENERAL
DAILY ACTIVITIY SCORE: 24
DAILY ACTIVITIY SCORE: 24
PATIENT BASELINE BEDBOUND: NO
MOBILITY SCORE: 24
MOBILITY SCORE: 24

## 2024-04-21 ASSESSMENT — PAIN - FUNCTIONAL ASSESSMENT
PAIN_FUNCTIONAL_ASSESSMENT: 0-10

## 2024-04-21 ASSESSMENT — PATIENT HEALTH QUESTIONNAIRE - PHQ9
SUM OF ALL RESPONSES TO PHQ9 QUESTIONS 1 & 2: 0
1. LITTLE INTEREST OR PLEASURE IN DOING THINGS: NOT AT ALL
2. FEELING DOWN, DEPRESSED OR HOPELESS: NOT AT ALL

## 2024-04-21 ASSESSMENT — PAIN DESCRIPTION - PAIN TYPE
TYPE: ACUTE PAIN

## 2024-04-21 ASSESSMENT — PAIN DESCRIPTION - LOCATION
LOCATION: ABDOMEN

## 2024-04-21 ASSESSMENT — ACTIVITIES OF DAILY LIVING (ADL)
BATHING: INDEPENDENT
TOILETING: INDEPENDENT
LACK_OF_TRANSPORTATION: NO
GROOMING: INDEPENDENT
HEARING - LEFT EAR: FUNCTIONAL
ADEQUATE_TO_COMPLETE_ADL: YES
WALKS IN HOME: INDEPENDENT
JUDGMENT_ADEQUATE_SAFELY_COMPLETE_DAILY_ACTIVITIES: YES
DRESSING YOURSELF: INDEPENDENT
FEEDING YOURSELF: INDEPENDENT
PATIENT'S MEMORY ADEQUATE TO SAFELY COMPLETE DAILY ACTIVITIES?: YES
HEARING - RIGHT EAR: FUNCTIONAL

## 2024-04-21 ASSESSMENT — PAIN SCALES - GENERAL
PAINLEVEL_OUTOF10: 10 - WORST POSSIBLE PAIN
PAINLEVEL_OUTOF10: 6
PAINLEVEL_OUTOF10: 7
PAINLEVEL_OUTOF10: 6
PAINLEVEL_OUTOF10: 3
PAINLEVEL_OUTOF10: 3
PAINLEVEL_OUTOF10: 5 - MODERATE PAIN
PAINLEVEL_OUTOF10: 5 - MODERATE PAIN
PAINLEVEL_OUTOF10: 0 - NO PAIN
PAINLEVEL_OUTOF10: 5 - MODERATE PAIN

## 2024-04-21 ASSESSMENT — LIFESTYLE VARIABLES
AUDIT-C TOTAL SCORE: -1
AUDIT-C TOTAL SCORE: -1
HOW OFTEN DO YOU HAVE 6 OR MORE DRINKS ON ONE OCCASION: PATIENT DECLINED
HOW MANY STANDARD DRINKS CONTAINING ALCOHOL DO YOU HAVE ON A TYPICAL DAY: PATIENT DECLINED
HOW OFTEN DO YOU HAVE A DRINK CONTAINING ALCOHOL: PATIENT DECLINED
SKIP TO QUESTIONS 9-10: 0

## 2024-04-21 ASSESSMENT — PAIN DESCRIPTION - DESCRIPTORS
DESCRIPTORS: CRAMPING
DESCRIPTORS: CRAMPING
DESCRIPTORS: CRAMPING;PRESSURE

## 2024-04-21 ASSESSMENT — COLUMBIA-SUICIDE SEVERITY RATING SCALE - C-SSRS
6. HAVE YOU EVER DONE ANYTHING, STARTED TO DO ANYTHING, OR PREPARED TO DO ANYTHING TO END YOUR LIFE?: NO
2. HAVE YOU ACTUALLY HAD ANY THOUGHTS OF KILLING YOURSELF?: NO
1. IN THE PAST MONTH, HAVE YOU WISHED YOU WERE DEAD OR WISHED YOU COULD GO TO SLEEP AND NOT WAKE UP?: NO

## 2024-04-21 ASSESSMENT — PAIN DESCRIPTION - ORIENTATION
ORIENTATION: RIGHT;LEFT;MID

## 2024-04-21 ASSESSMENT — PAIN DESCRIPTION - FREQUENCY
FREQUENCY: CONSTANT/CONTINUOUS

## 2024-04-21 ASSESSMENT — PAIN SCALES - WONG BAKER
WONGBAKER_NUMERICALRESPONSE: HURTS WHOLE LOT
WONGBAKER_NUMERICALRESPONSE: HURTS LITTLE MORE

## 2024-04-21 NOTE — H&P
"History Of Present Illness  Jhonatan Huffman is a 28 y.o. male presenting with abdominal pain.  No significant past medical history.  Complaining of abdominal pain starting yesterday with nausea and vomiting.  He stated that it worsened today and he presented to the emergency room where a CT of the abdomen pelvis showed pancreatitis and a right upper quadrant ultrasound showed cholelithiasis.  His abdominal pain has improved after pain medication and his nausea has improved after Zofran.  He denies any chest pain or shortness of breath.  He denies any headache, dizziness, nausea/vomiting.  Past Medical History  History reviewed. No pertinent past medical history.    Surgical History  History reviewed. No pertinent surgical history.     Social History  He reports that he has been smoking cigarettes. He has a 8 pack-year smoking history. He has never used smokeless tobacco. He reports that he does not currently use alcohol. He reports that he does not currently use drugs after having used the following drugs: Cocaine, Marijuana, LSD, Oxycodone, and Psilocybin.    Family History  No family history on file.     Allergies  Haldol [haloperidol]    Review of Systems  All systems reviewed and negative except as noted in HPI  Physical Exam  Constitutional: No acute distress, calm, cooperative  HEENT: PERRL, normocephalic, atraumatic, mucous membranes moist  Cardiovascular: Regular rhythm and rate,   Respiratory: Lungs clear to auscultation,   Gastrointestinal: Bowel sounds positive x 4, soft, tender to the touch, obese  Neurologic: Alert and oriented x 3 equal strength bilaterally  Musculoskeletal: Able to move all extremities, no edema  Skin: Warm, dry and intact  Last Recorded Vitals  Blood pressure 116/73, pulse 79, temperature 36.9 °C (98.4 °F), temperature source Oral, resp. rate 20, height 1.702 m (5' 7\"), weight 116 kg (255 lb 11.7 oz), SpO2 98%.             Assessment/Plan   Principal Problem:    Gallstone " pancreatitis (HHS-HCC)  Cholelithiasis  Pancreatitis  .  With abdominal pain and nausea/vomiting  .  Pain control  .  Zofran for nausea  .  Clear liquid diet  .  N.p.o. past midnight tonight  .  D5 LR with 20 of K at 125 mL/h  .  Rocephin  .  Consult general surgery    Paranoid schizophrenia  .  Continue home Prozac    DVT prophylaxis  .  Sequential compression           I spent 75 minutes in the professional and overall care of this patient.      Asa Iverson, APRN-CNP

## 2024-04-21 NOTE — ED PROVIDER NOTES
Patient was seen by both myself and advanced practitioner.  I personally saw the patient and made/approved the management plan and take responsibility for the patient management     Patient is a 28-year-old male that presents emergency room for evaluation of abdominal pain, nausea and vomiting.  Patient states that symptoms have been going off and on for the last month however became significantly more persistent over the last 24 hours.  He has had nausea with several episodes of nonbloody, nonbilious emesis.  He did take some Zofran at home with mild improvement of symptoms.  He has been having diffuse abdominal pain worse in the upper abdomen that describes as a sharp cramping sensation.  It has been persistent and moderate in severity.  He denies fever, chills, chest pain or shortness of breath.    On exam patient uncomfortable appearing but in no obvious distress.  Rates pain as a 5 out of 10 and does have significant generalized tenderness palpation worse in the epigastric region.  There is no rigidity or guarding.  Eitel signs are stable as patient has normal heart rate, normal blood pressure.  Lungs are clear to auscultation bilaterally, respirations easy, nonlabored.  There is no jaundice of the skin.  Blood work is ordered including CBC, CMP, lipase along with urinalysis and a CT scan of the abdomen pelvis.  He was treated with IV Toradol, IV Zofran, IM Bentyl.  Patient only had mild improvement of pain on reevaluation and was given IV morphine.  Blood work was remarkable and consistent with pancreatitis as he has a lipase greater than 3000, leukocytosis white count of 15 as well as mildly elevated liver enzymes with a bilirubin of 2.8, elevated ALT and AST.  CT scan consistent with pancreatitis.  Patient has no history of any alcohol abuse and no recent alcohol consumption.  Triglyceride level also ordered at this time and ultrasound of the right upper quadrant to evaluate for possible gallstone  pancreatitis.  Ultrasound does show evidence of cholelithiasis however no evidence of obstruction at this time.  Patient will be admitted for further workup of his acute pancreatitis.    I independently interpreted the following study(s) CT scan of the abdomen pelvis, ultrasound right upper quadrant which show CT scan of the abdomen pelvis shows inflammation around the pancreas consistent with pancreatitis.  There is no evidence of obstruction, perforation or abscess.  Ultrasound shows evidence of cholelithiasis however there is no evidence of extrahepatic or intrahepatic biliary duct dilation and common bile duct is at 6.5 mm at the upper limit of normal.     Nehemias Hunt, DO  04/21/24 1630       Nehemias Hunt, DO  04/21/24 1631

## 2024-04-21 NOTE — ED PROVIDER NOTES
HPI   Chief Complaint   Patient presents with    Abdominal Pain     Abd pain with diarrhea and vomiting since last night       HPI     Patient is a 28-year-old male presenting for evaluation of abdominal pain, nausea and vomiting since last night.  Patient states that he vomited twice today but had taken some Zofran at home with some relief.  He states the abdominal pain is sharp and cramping-like, diffusely throughout his abdomen.  He denies recent illness.  No fever or chills.  No dysuria or hematuria.  No flank pain.  Mother at the bedside states that her family does have a history of kidney stones.  Patient denies testicular pain.  No chest pain or shortness of breath.  No diarrhea or constipation.  No bloody stools.  No alcohol or drug use.               Kendall Coma Scale Score: 15                     Patient History   History reviewed. No pertinent past medical history.  History reviewed. No pertinent surgical history.  No family history on file.  Social History     Tobacco Use    Smoking status: Every Day     Current packs/day: 1.00     Average packs/day: 1 pack/day for 8.0 years (8.0 ttl pk-yrs)     Types: Cigarettes    Smokeless tobacco: Never   Substance Use Topics    Alcohol use: Not Currently    Drug use: Not Currently     Types: Cocaine, Marijuana, LSD, Oxycodone, Psilocybin       Physical Exam   ED Triage Vitals [04/21/24 1232]   Temperature Heart Rate Respirations BP   36.6 °C (97.9 °F) 79 20 118/73      Pulse Ox Temp Source Heart Rate Source Patient Position   98 % Oral Monitor Sitting      BP Location FiO2 (%)     Right arm --       Physical Exam    GENERAL: Well nourished and well developed. Answers questions appropriately.    SKIN: Clean and dry without evidence of rashes.    HEENT: Skull normocephalic, atraumatic. No scleral icterus. PERRLA, with EOMI.  Mucous membranes moist and pink in color.     RESPIRATORY: Clear to ausculation with normal effort. No adventitious sounds, intercostal  retractions or shallow breathing.    CARDIOVASCULAR: Regular rate and rhythm with normal S1, S2. No S3, S4, murmurs or gallops. Capillary refill brisk, less than 3 seconds bilaterally. No unilateral lower extremity edema.    ABD/: Diffuse tenderness to the abdomen on palpation.  Abdomen is soft.  Bowel sounds equal in all 4 quadrants.    MSK: Spontaneously moves all 4 extremities without difficulty.  No injury or obvious deformity.      NEURO/PSYCH: A&Ox3. Cranial nerves II-XII grossly intact. No focal motor or sensory deficits. Appropriate mood and behavior.    ED Course & MDM   Diagnoses as of 04/21/24 1628   Gallstone pancreatitis ([facility])       Medical Decision Making  Parts of this chart have been completed using voice recognition software. Please excuse any errors of transcription. Despite the medical decision making time stamp above-my medical decision making has taken place during the patient's entire visit. My thought process and reason for plan has been formulated from the time that I saw the patient until the time of disposition and is not specific to one specific moment during their visit and furthermore my MDM encompasses this entire chart and not only this text box.      HPI: Detailed above.    Exam: A medically appropriate exam performed, outlined above, given the known history and presentation.    History obtained from: Patient, mother    Social Determinants of Health considered during this visit: Lives at home    EKG interpreted by my attending physician, reviewed by myself.    Labs Reviewed   CBC WITH AUTO DIFFERENTIAL - Abnormal       Result Value    WBC 15.7 (*)     nRBC 0.0      RBC 5.97 (*)     Hemoglobin 16.1      Hematocrit 46.8      MCV 78 (*)     MCH 27.0      MCHC 34.4      RDW 13.2      Platelets 305      Neutrophils % 73.7      Immature Granulocytes %, Automated 0.3      Lymphocytes % 17.8      Monocytes % 5.9      Eosinophils % 2.0      Basophils % 0.3      Neutrophils Absolute 11.56  (*)     Immature Granulocytes Absolute, Automated 0.05      Lymphocytes Absolute 2.79      Monocytes Absolute 0.93      Eosinophils Absolute 0.32      Basophils Absolute 0.05     COMPREHENSIVE METABOLIC PANEL - Abnormal    Glucose 117 (*)     Sodium 140      Potassium 3.5      Chloride 103      Bicarbonate 25      Urea Nitrogen 11      Creatinine 0.90      eGFR >90      Calcium 9.4      Albumin 4.5      Alkaline Phosphatase 115      Total Protein 7.6       (*)     Bilirubin, Total 2.8 (*)      (*)     Anion Gap 12     LIPASE - Abnormal    Lipase >3,000 (*)    SARS-COV-2 AND INFLUENZA A/B PCR - Normal    Flu A Result Not Detected      Flu B Result Not Detected      Coronavirus 2019, PCR Not Detected      Narrative:     This assay has received FDA Emergency Use Authorization (EUA) and  is only authorized for the duration of time that circumstances exist to justify the authorization of the emergency use of in vitro diagnostic tests for the detection of SARS-CoV-2 virus and/or diagnosis of COVID-19 infection under section 564(b)(1) of the Act, 21 U.S.C. 360bbb-3(b)(1). Testing for SARS-CoV-2 is only recommended for patients who meet current clinical and/or epidemiological criteria as defined by federal, state, or local public health directives. This assay is an in vitro diagnostic nucleic acid amplification test for the qualitative detection of SARS-CoV-2, Influenza A, and Influenza B from nasopharyngeal specimens and has been validated for use at [facility]. Negative results do not preclude COVID-19 infections or Influenza A/B infections, and should not be used as the sole basis for diagnosis, treatment, or other management decisions. If Influenza A/B and RSV PCR results are negative, testing for Parainfluenza virus, Adenovirus and Metapneumovirus is routinely performed for [facility] pediatric oncology and intensive care inpatients, and is available on other patients by placing an add-on  request.    URINALYSIS WITH REFLEX CULTURE AND MICROSCOPIC    Narrative:     The following orders were created for panel order Urinalysis with Reflex Culture and Microscopic.  Procedure                               Abnormality         Status                     ---------                               -----------         ------                     Urinalysis with Reflex C...[[id number]]                                                 Extra Urine Gray Tube[[id number]]                                                         Please view results for these tests on the individual orders.   URINALYSIS WITH REFLEX CULTURE AND MICROSCOPIC   EXTRA URINE GRAY TUBE   TRIGLYCERIDES     US right upper quadrant   Final Result   Limited exam due to patient's body habitus and large amount of bowel   gas.        Fatty infiltration of the liver.        Cholelithiasis and slightly thickened gallbladder wall without   definite sonographic Lucas sign.        MACRO:   None        Signed by: Winter Odonnell 4/21/2024 3:53 PM   Dictation workstation:   [id number]      CT abdomen pelvis w IV contrast   Final Result   1.  Mild diffuse acute interstitial edematous pancreatitis without   fluid collection.   2. Hepatic steatosis.        MACRO:   None.        Signed by: Anthony Choi 4/21/2024 2:13 PM   Dictation workstation:   [id number]        Medications   cefTRIAXone (Rocephin) IVPB 1 g (has no administration in time range)   lactated Ringer's bolus 1,000 mL (has no administration in time range)   dextrose 5 % and lactated Ringer's with KCl 20 mEq/L infusion (has no administration in time range)   sodium chloride 0.9 % bolus 1,000 mL (0 mL intravenous Stopped 4/21/24 1354)   ondansetron (Zofran) injection 4 mg (4 mg intravenous Given 4/21/24 1253)   ketorolac (Toradol) injection 15 mg (15 mg intravenous Given 4/21/24 1254)   dicyclomine (Bentyl) injection 20 mg (20 mg intramuscular Given 4/21/24 1253)   iohexol (OMNIPaque) 350 mg iodine/mL  solution 75 mL (75 mL intravenous Given 4/21/24 1402)   morphine injection 4 mg (4 mg intravenous Given 4/21/24 1438)         Differential diagnoses considered for this visit include: Gastroenteritis versus viral illness versus acute pancreatitis versus acute cholecystitis    Considerations/further MDM:    Patient is a 28-year-old male senting for evaluation of diffuse abdominal pain with vomiting.  CBC appreciated leukocytosis of 15.7.  Liver enzymes are also elevated with a bilirubin of 2.8.  Lipase elevated greater than 3000. CT abdomen pelvis identified mild diffuse acute interstitial edematous pancreatitis without fluid collection.  Also hepatic steatosis.  On review of elevated liver enzymes, right upper quadrant ultrasound was ordered with additional triglyceride level. Ultrasound of the right upper quadrant identified a limited exam due to patient's body habitus and large amount of bowel gas.  Identified cholelithiasis and slightly thickened gallbladder wall without definitive sonographic Lucas sign.Given that the patient was presenting with uncontrolled pain here in the emergency department with elevated liver enzymes and high lipase, admission to the hospital was warranted.  I discussed this with the on-call hospitalist Dr. Hillman, and the patient was admitted to the regular nursing floor in good condition.    The patient/family was counseled on clinical impression, expectations, and plan along with recommendations to admission. All questions were answered and involved parties were understanding and agreeable to course of treatment. Case was discussed with admitting physician and any consultants. Bed type, ED treatment and further ED workup decided by joint decision making with admitting team and any consultants. Patient stable for admission per my assessment and further management of patient will be deferred to the inpatient setting.    Patient was seen in conjunction with attending physician   Nehemias Hunt.   Patient's history, physical exam, diagnostic studies, and treatment plan were discussed thoroughly.    Procedure  Procedures     [place] PA-C  04/21/24 4725

## 2024-04-22 ENCOUNTER — APPOINTMENT (OUTPATIENT)
Dept: RADIOLOGY | Facility: HOSPITAL | Age: 29
DRG: 417 | End: 2024-04-22
Payer: COMMERCIAL

## 2024-04-22 ENCOUNTER — APPOINTMENT (OUTPATIENT)
Dept: RADIOLOGY | Facility: HOSPITAL | Age: 29
End: 2024-04-22
Payer: COMMERCIAL

## 2024-04-22 LAB
ALBUMIN SERPL-MCNC: 4.1 G/DL (ref 3.5–5)
ALP BLD-CCNC: 101 U/L (ref 35–125)
ALT SERPL-CCNC: 398 U/L (ref 5–40)
AMYLASE SERPL-CCNC: 467 U/L (ref 28–100)
ANION GAP SERPL CALC-SCNC: 9 MMOL/L
AST SERPL-CCNC: 169 U/L (ref 5–40)
BILIRUB SERPL-MCNC: 0.9 MG/DL (ref 0.1–1.2)
BUN SERPL-MCNC: 7 MG/DL (ref 8–25)
CALCIUM SERPL-MCNC: 8.8 MG/DL (ref 8.5–10.4)
CHLORIDE SERPL-SCNC: 105 MMOL/L (ref 97–107)
CO2 SERPL-SCNC: 26 MMOL/L (ref 24–31)
CREAT SERPL-MCNC: 0.8 MG/DL (ref 0.4–1.6)
EGFRCR SERPLBLD CKD-EPI 2021: >90 ML/MIN/1.73M*2
ERYTHROCYTE [DISTWIDTH] IN BLOOD BY AUTOMATED COUNT: 13.4 % (ref 11.5–14.5)
GLUCOSE BLD MANUAL STRIP-MCNC: 115 MG/DL (ref 74–99)
GLUCOSE SERPL-MCNC: 108 MG/DL (ref 65–99)
HCT VFR BLD AUTO: 43.4 % (ref 41–52)
HGB BLD-MCNC: 14.5 G/DL (ref 13.5–17.5)
LIPASE SERPL-CCNC: 856 U/L (ref 16–63)
MCH RBC QN AUTO: 26.9 PG (ref 26–34)
MCHC RBC AUTO-ENTMCNC: 33.4 G/DL (ref 32–36)
MCV RBC AUTO: 80 FL (ref 80–100)
NRBC BLD-RTO: 0 /100 WBCS (ref 0–0)
PLATELET # BLD AUTO: 268 X10*3/UL (ref 150–450)
POTASSIUM SERPL-SCNC: 4 MMOL/L (ref 3.4–5.1)
PROT SERPL-MCNC: 6.9 G/DL (ref 5.9–7.9)
RBC # BLD AUTO: 5.4 X10*6/UL (ref 4.5–5.9)
SODIUM SERPL-SCNC: 140 MMOL/L (ref 133–145)
TRIGL SERPL-MCNC: 199 MG/DL (ref 40–150)
WBC # BLD AUTO: 14.5 X10*3/UL (ref 4.4–11.3)

## 2024-04-22 PROCEDURE — S4991 NICOTINE PATCH NONLEGEND: HCPCS | Performed by: INTERNAL MEDICINE

## 2024-04-22 PROCEDURE — 2500000004 HC RX 250 GENERAL PHARMACY W/ HCPCS (ALT 636 FOR OP/ED): Performed by: NURSE PRACTITIONER

## 2024-04-22 PROCEDURE — 85027 COMPLETE CBC AUTOMATED: CPT | Performed by: NURSE PRACTITIONER

## 2024-04-22 PROCEDURE — 2500000001 HC RX 250 WO HCPCS SELF ADMINISTERED DRUGS (ALT 637 FOR MEDICARE OP)

## 2024-04-22 PROCEDURE — 1100000001 HC PRIVATE ROOM DAILY

## 2024-04-22 PROCEDURE — 84075 ASSAY ALKALINE PHOSPHATASE: CPT | Performed by: NURSE PRACTITIONER

## 2024-04-22 PROCEDURE — 83690 ASSAY OF LIPASE: CPT | Performed by: NURSE PRACTITIONER

## 2024-04-22 PROCEDURE — 2500000002 HC RX 250 W HCPCS SELF ADMINISTERED DRUGS (ALT 637 FOR MEDICARE OP, ALT 636 FOR OP/ED): Performed by: INTERNAL MEDICINE

## 2024-04-22 PROCEDURE — 99222 1ST HOSP IP/OBS MODERATE 55: CPT | Performed by: SURGERY

## 2024-04-22 PROCEDURE — 74181 MRI ABDOMEN W/O CONTRAST: CPT

## 2024-04-22 PROCEDURE — 82947 ASSAY GLUCOSE BLOOD QUANT: CPT

## 2024-04-22 PROCEDURE — 36415 COLL VENOUS BLD VENIPUNCTURE: CPT | Performed by: NURSE PRACTITIONER

## 2024-04-22 PROCEDURE — 2500000004 HC RX 250 GENERAL PHARMACY W/ HCPCS (ALT 636 FOR OP/ED): Performed by: INTERNAL MEDICINE

## 2024-04-22 PROCEDURE — 82150 ASSAY OF AMYLASE: CPT | Performed by: NURSE PRACTITIONER

## 2024-04-22 RX ORDER — HYDROMORPHONE HYDROCHLORIDE 1 MG/ML
1 INJECTION, SOLUTION INTRAMUSCULAR; INTRAVENOUS; SUBCUTANEOUS EVERY 2 HOUR PRN
Status: DISCONTINUED | OUTPATIENT
Start: 2024-04-22 | End: 2024-04-24

## 2024-04-22 RX ORDER — HYDROMORPHONE HYDROCHLORIDE 1 MG/ML
0.6 INJECTION, SOLUTION INTRAMUSCULAR; INTRAVENOUS; SUBCUTANEOUS
Status: DISCONTINUED | OUTPATIENT
Start: 2024-04-22 | End: 2024-04-24

## 2024-04-22 RX ORDER — KETOROLAC TROMETHAMINE 30 MG/ML
30 INJECTION, SOLUTION INTRAMUSCULAR; INTRAVENOUS EVERY 6 HOURS PRN
Status: DISCONTINUED | OUTPATIENT
Start: 2024-04-22 | End: 2024-04-24

## 2024-04-22 RX ADMIN — Medication 1 PATCH: at 09:04

## 2024-04-22 RX ADMIN — ONDANSETRON HYDROCHLORIDE 4 MG: 2 INJECTION INTRAMUSCULAR; INTRAVENOUS at 01:38

## 2024-04-22 RX ADMIN — CEFTRIAXONE SODIUM 1 G: 1 INJECTION, SOLUTION INTRAVENOUS at 16:32

## 2024-04-22 RX ADMIN — ONDANSETRON HYDROCHLORIDE 4 MG: 2 INJECTION INTRAMUSCULAR; INTRAVENOUS at 22:58

## 2024-04-22 RX ADMIN — DEXTROSE MONOHYDRATE, SODIUM CHLORIDE, SODIUM LACTATE, POTASSIUM CHLORIDE, CALCIUM CHLORIDE 125 ML/HR: 5; 600; 310; 179; 20 INJECTION, SOLUTION INTRAVENOUS at 11:51

## 2024-04-22 RX ADMIN — ONDANSETRON HYDROCHLORIDE 4 MG: 2 INJECTION INTRAMUSCULAR; INTRAVENOUS at 05:51

## 2024-04-22 RX ADMIN — KETOROLAC TROMETHAMINE 30 MG: 30 INJECTION INTRAMUSCULAR; INTRAVENOUS at 00:52

## 2024-04-22 RX ADMIN — DEXTROSE MONOHYDRATE, SODIUM CHLORIDE, SODIUM LACTATE, POTASSIUM CHLORIDE, CALCIUM CHLORIDE 125 ML/HR: 5; 600; 310; 179; 20 INJECTION, SOLUTION INTRAVENOUS at 23:51

## 2024-04-22 RX ADMIN — ONDANSETRON HYDROCHLORIDE 4 MG: 2 INJECTION INTRAMUSCULAR; INTRAVENOUS at 10:13

## 2024-04-22 RX ADMIN — FAMOTIDINE 20 MG: 10 INJECTION, SOLUTION INTRAVENOUS at 22:58

## 2024-04-22 RX ADMIN — HYDROMORPHONE HYDROCHLORIDE 0.2 MG: 1 INJECTION, SOLUTION INTRAMUSCULAR; INTRAVENOUS; SUBCUTANEOUS at 09:00

## 2024-04-22 RX ADMIN — HYDROMORPHONE HYDROCHLORIDE 0.2 MG: 1 INJECTION, SOLUTION INTRAMUSCULAR; INTRAVENOUS; SUBCUTANEOUS at 01:42

## 2024-04-22 RX ADMIN — DEXTROSE MONOHYDRATE, SODIUM CHLORIDE, SODIUM LACTATE, POTASSIUM CHLORIDE, CALCIUM CHLORIDE 125 ML/HR: 5; 600; 310; 179; 20 INJECTION, SOLUTION INTRAVENOUS at 02:45

## 2024-04-22 RX ADMIN — FLUOXETINE HYDROCHLORIDE 20 MG: 20 CAPSULE ORAL at 22:58

## 2024-04-22 RX ADMIN — ONDANSETRON HYDROCHLORIDE 4 MG: 2 INJECTION INTRAMUSCULAR; INTRAVENOUS at 18:28

## 2024-04-22 RX ADMIN — HYDROMORPHONE HYDROCHLORIDE 0.2 MG: 1 INJECTION, SOLUTION INTRAMUSCULAR; INTRAVENOUS; SUBCUTANEOUS at 11:20

## 2024-04-22 RX ADMIN — KETOROLAC TROMETHAMINE 30 MG: 30 INJECTION, SOLUTION INTRAMUSCULAR at 19:23

## 2024-04-22 RX ADMIN — FAMOTIDINE 20 MG: 10 INJECTION, SOLUTION INTRAVENOUS at 08:53

## 2024-04-22 RX ADMIN — KETOROLAC TROMETHAMINE 30 MG: 30 INJECTION INTRAMUSCULAR; INTRAVENOUS at 05:51

## 2024-04-22 RX ADMIN — KETOROLAC TROMETHAMINE 30 MG: 30 INJECTION INTRAMUSCULAR; INTRAVENOUS at 13:08

## 2024-04-22 RX ADMIN — HYDROMORPHONE HYDROCHLORIDE 1 MG: 1 INJECTION, SOLUTION INTRAMUSCULAR; INTRAVENOUS; SUBCUTANEOUS at 22:59

## 2024-04-22 RX ADMIN — HYDROMORPHONE HYDROCHLORIDE 0.4 MG: 1 INJECTION, SOLUTION INTRAMUSCULAR; INTRAVENOUS; SUBCUTANEOUS at 05:09

## 2024-04-22 RX ADMIN — ONDANSETRON HYDROCHLORIDE 4 MG: 2 INJECTION INTRAMUSCULAR; INTRAVENOUS at 14:49

## 2024-04-22 RX ADMIN — HYDROMORPHONE HYDROCHLORIDE 0.6 MG: 1 INJECTION, SOLUTION INTRAMUSCULAR; INTRAVENOUS; SUBCUTANEOUS at 16:31

## 2024-04-22 ASSESSMENT — PAIN - FUNCTIONAL ASSESSMENT
PAIN_FUNCTIONAL_ASSESSMENT: 0-10

## 2024-04-22 ASSESSMENT — COGNITIVE AND FUNCTIONAL STATUS - GENERAL
MOBILITY SCORE: 24
DAILY ACTIVITIY SCORE: 24
MOBILITY SCORE: 24
DAILY ACTIVITIY SCORE: 24

## 2024-04-22 ASSESSMENT — PAIN SCALES - GENERAL
PAINLEVEL_OUTOF10: 0 - NO PAIN
PAINLEVEL_OUTOF10: 7
PAINLEVEL_OUTOF10: 2
PAINLEVEL_OUTOF10: 4
PAINLEVEL_OUTOF10: 6
PAINLEVEL_OUTOF10: 2
PAINLEVEL_OUTOF10: 2
PAINLEVEL_OUTOF10: 5 - MODERATE PAIN
PAINLEVEL_OUTOF10: 6
PAINLEVEL_OUTOF10: 8
PAINLEVEL_OUTOF10: 7
PAINLEVEL_OUTOF10: 7
PAINLEVEL_OUTOF10: 8
PAINLEVEL_OUTOF10: 4
PAINLEVEL_OUTOF10: 7

## 2024-04-22 ASSESSMENT — PAIN DESCRIPTION - LOCATION
LOCATION: ABDOMEN
LOCATION: BACK
LOCATION: BACK

## 2024-04-22 ASSESSMENT — PAIN DESCRIPTION - ORIENTATION
ORIENTATION: MID
ORIENTATION: MID;LOWER

## 2024-04-22 ASSESSMENT — ACTIVITIES OF DAILY LIVING (ADL): LACK_OF_TRANSPORTATION: NO

## 2024-04-22 NOTE — PROGRESS NOTES
"Jhonatan Huffman is a 28 y.o. male on day 1 of admission presenting with Gallstone pancreatitis ([facility]).    Subjective   Seen and examined decreasing abdominal pain liver function test slowly improving awaiting surgical input and consultation continue IV fluids antibiotics and pain medications       Objective     Physical Exam  Vitals and nursing note reviewed.   Constitutional:       Appearance: Normal appearance. He is obese.   HENT:      Head: Normocephalic and atraumatic.      Mouth/Throat:      Mouth: Mucous membranes are dry.   Eyes:      Extraocular Movements: Extraocular movements intact.      Pupils: Pupils are equal, round, and reactive to light.   Cardiovascular:      Rate and Rhythm: Normal rate and regular rhythm.      Pulses: Normal pulses.      Heart sounds: Normal heart sounds.   Pulmonary:      Effort: Pulmonary effort is normal.      Breath sounds: Normal breath sounds.   Abdominal:      Palpations: Abdomen is soft.      Tenderness: There is abdominal tenderness.   Musculoskeletal:         General: Normal range of motion.      Cervical back: Normal range of motion.   Skin:     General: Skin is warm and dry.      Capillary Refill: Capillary refill takes less than 2 seconds.   Neurological:      General: No focal deficit present.      Mental Status: He is alert and oriented to person, place, and time. Mental status is at baseline.   Psychiatric:         Mood and Affect: Mood normal.         Last Recorded Vitals  Blood pressure 124/81, pulse 88, temperature 36.9 °C (98.4 °F), temperature source Tympanic, resp. rate 20, height 1.702 m (5' 7.01\"), weight 117 kg (257 lb 0.9 oz), SpO2 97%.  Intake/Output last 3 Shifts:  I/O last 3 completed shifts:  In: 2300 (19.7 mL/kg) [IV Piggyback:2300]  Out: - (0 mL/kg)   Weight: 116.6 kg     Relevant Results              Results for orders placed or performed during the hospital encounter of 04/21/24 (from the past 24 hour(s))   CBC and Auto Differential   Result " Value Ref Range    WBC 15.7 (H) 4.4 - 11.3 x10*3/uL    nRBC 0.0 0.0 - 0.0 /100 WBCs    RBC 5.97 (H) 4.50 - 5.90 x10*6/uL    Hemoglobin 16.1 13.5 - 17.5 g/dL    Hematocrit 46.8 41.0 - 52.0 %    MCV 78 (L) 80 - 100 fL    MCH 27.0 26.0 - 34.0 pg    MCHC 34.4 32.0 - 36.0 g/dL    RDW 13.2 11.5 - 14.5 %    Platelets 305 150 - 450 x10*3/uL    Neutrophils % 73.7 40.0 - 80.0 %    Immature Granulocytes %, Automated 0.3 0.0 - 0.9 %    Lymphocytes % 17.8 13.0 - 44.0 %    Monocytes % 5.9 2.0 - 10.0 %    Eosinophils % 2.0 0.0 - 6.0 %    Basophils % 0.3 0.0 - 2.0 %    Neutrophils Absolute 11.56 (H) 1.20 - 7.70 x10*3/uL    Immature Granulocytes Absolute, Automated 0.05 0.00 - 0.70 x10*3/uL    Lymphocytes Absolute 2.79 1.20 - 4.80 x10*3/uL    Monocytes Absolute 0.93 0.10 - 1.00 x10*3/uL    Eosinophils Absolute 0.32 0.00 - 0.70 x10*3/uL    Basophils Absolute 0.05 0.00 - 0.10 x10*3/uL   Comprehensive metabolic panel   Result Value Ref Range    Glucose 117 (H) 65 - 99 mg/dL    Sodium 140 133 - 145 mmol/L    Potassium 3.5 3.4 - 5.1 mmol/L    Chloride 103 97 - 107 mmol/L    Bicarbonate 25 24 - 31 mmol/L    Urea Nitrogen 11 8 - 25 mg/dL    Creatinine 0.90 0.40 - 1.60 mg/dL    eGFR >90 >60 mL/min/1.73m*2    Calcium 9.4 8.5 - 10.4 mg/dL    Albumin 4.5 3.5 - 5.0 g/dL    Alkaline Phosphatase 115 35 - 125 U/L    Total Protein 7.6 5.9 - 7.9 g/dL     (H) 5 - 40 U/L    Bilirubin, Total 2.8 (H) 0.1 - 1.2 mg/dL     (H) 5 - 40 U/L    Anion Gap 12 <=19 mmol/L   Lipase   Result Value Ref Range    Lipase >3,000 (H) 16 - 63 U/L   Triglycerides   Result Value Ref Range    Triglycerides 199 (H) 40 - 150 mg/dL   Sars-CoV-2 and Influenza A/B PCR   Result Value Ref Range    Flu A Result Not Detected Not Detected    Flu B Result Not Detected Not Detected    Coronavirus 2019, PCR Not Detected Not Detected   Urinalysis with Reflex Culture and Microscopic   Result Value Ref Range    Color, Urine Yellow Light-Yellow, Yellow, Dark-Yellow     Appearance, Urine Clear Clear    Specific Gravity, Urine >1.050 (N) 1.005 - 1.035    pH, Urine 7.5 5.0, 5.5, 6.0, 6.5, 7.0, 7.5, 8.0    Protein, Urine 10 (TRACE) NEGATIVE, 10 (TRACE), 20 (TRACE) mg/dL    Glucose, Urine Normal Normal mg/dL    Blood, Urine NEGATIVE NEGATIVE    Ketones, Urine NEGATIVE NEGATIVE mg/dL    Bilirubin, Urine NEGATIVE NEGATIVE    Urobilinogen, Urine Normal Normal mg/dL    Nitrite, Urine NEGATIVE NEGATIVE    Leukocyte Esterase, Urine NEGATIVE NEGATIVE   Urinalysis Microscopic   Result Value Ref Range    WBC, Urine 1-5 1-5, NONE /HPF    RBC, Urine NONE NONE, 1-2, 3-5 /HPF    Mucus, Urine FEW Reference range not established. /LPF   CBC   Result Value Ref Range    WBC 14.5 (H) 4.4 - 11.3 x10*3/uL    nRBC 0.0 0.0 - 0.0 /100 WBCs    RBC 5.40 4.50 - 5.90 x10*6/uL    Hemoglobin 14.5 13.5 - 17.5 g/dL    Hematocrit 43.4 41.0 - 52.0 %    MCV 80 80 - 100 fL    MCH 26.9 26.0 - 34.0 pg    MCHC 33.4 32.0 - 36.0 g/dL    RDW 13.4 11.5 - 14.5 %    Platelets 268 150 - 450 x10*3/uL   Comprehensive metabolic panel   Result Value Ref Range    Glucose 108 (H) 65 - 99 mg/dL    Sodium 140 133 - 145 mmol/L    Potassium 4.0 3.4 - 5.1 mmol/L    Chloride 105 97 - 107 mmol/L    Bicarbonate 26 24 - 31 mmol/L    Urea Nitrogen 7 (L) 8 - 25 mg/dL    Creatinine 0.80 0.40 - 1.60 mg/dL    eGFR >90 >60 mL/min/1.73m*2    Calcium 8.8 8.5 - 10.4 mg/dL    Albumin 4.1 3.5 - 5.0 g/dL    Alkaline Phosphatase 101 35 - 125 U/L    Total Protein 6.9 5.9 - 7.9 g/dL     (H) 5 - 40 U/L    Bilirubin, Total 0.9 0.1 - 1.2 mg/dL     (H) 5 - 40 U/L    Anion Gap 9 <=19 mmol/L   Amylase   Result Value Ref Range    Amylase 467 (H) 28 - 100 U/L   Lipase   Result Value Ref Range    Lipase     POCT GLUCOSE   Result Value Ref Range    POCT Glucose 115 (H) 74 - 99 mg/dL     Scheduled medications  cefTRIAXone, 1 g, intravenous, q24h  famotidine, 20 mg, intravenous, q12h VEDA  FLUoxetine, 20 mg, oral, Nightly  ketorolac, 30 mg, intravenous, q6h  VEDA  nicotine, 1 patch, transdermal, Daily  ondansetron, 4 mg, intravenous, q4h      Continuous medications   dextrose 5% lactated Ringer's with KCl 20 mEq/L, 125 mL/hr, Last Rate: 125 mL/hr (04/22/24 0627)      PRN medications  PRN medications: HYDROmorphone, HYDROmorphone                   Assessment/Plan   Principal Problem:    Gallstone pancreatitis ([facility])    Small improvement in laboratory values continue fluids and antibiotics awaiting surgical consultation and opinion suspect need for cholecystectomy be determined by surgeon       I spent 45 minutes in the professional and overall care of this patient.      Wilmer Hillman DO

## 2024-04-22 NOTE — CARE PLAN
The patient's goals for the shift include      The clinical goals for the shift include  pain control

## 2024-04-22 NOTE — CONSULTS
"Inpatient consult to Acute Care Surgery  Consult performed by: AMIRA Blevins-CNP  Consult ordered by: Asa Iverson, AMIRA-CNP      Reason For Consult  Gallstone pancreatitis     Location [facility]     History Of Present Illness  Jhonatan Huffman is a 28 y.o. male on day 1 of admission presenting with chief complaint of abdominal pain and was admitted with Gallstone pancreatitis ([facility]).  We were asked to see him as above.    Patient states that he presented with a chief complaint of generalized abdominal pain.  This started 2 days ago \"gradually sudden\", constant, progressively worse, no radiation, describes as sharp and stabbing, upon ER presentation was a 10 and is currently a 4-5.  Tried taking Mylanta which did not help.  Had subsequent nausea and vomiting without blood.  Is not nauseated now.  Said he last vomited less than 30 minutes ago without blood.  Denies ever previously being diagnosed with cholelithiasis.  Has had some milder versions in the past.  Denies having any problems with loose explosive stools.  Denies any right shoulder pain.    Denies ever previously being diagnosed with pancreatitis.  Home meds include Abilify and Prozac both of which he has been on and are not new. Denies any new medications. Denies any current alcohol use.  Denies ever being a heavy drinker except for only at the age of 23 and not before or after that.    Is passing gas.  Last bowel movement was either last night or this morning and was normal without blood.  Has never had an EGD or colonoscopy.  Breathing feels pretty good and normal.  He overall feels better than when he came in.  Has been n.p.o. today since midnight in case of surgery today.  Took clear liquid diet yesterday good without any issues.    Denies taking any anticoagulants prior to admission.  Is not on any anticoagulants here.  Please see his below past medical history.  Denies any past abdominal surgical history.  Please also see his labs and " imaging that are noted as delineated below.      Past Medical History  Past Medical History:   Diagnosis Date    Pancreatitis ([facility]CC)     Sleep apnea      Per patient:  Cigarette smoker  Depression  Schizophrenia  Umbilical hernia-says was identified on CT in past-when I look I found it on 3/16/23 CT-small fat containing    COPD on 2/29/24 CT  Left nephrolithiasis on 4/21/2024 CT abdomen pelvis  Obesity      Surgical History  [place] teeth extractions 10 years ago under general anesthesia  Denies any other surgeries or any abdominal surgeries  No surgeries listed under chart review surgery tab      Social History  Is from home with his grandparents  His grandfather is currently a patient here  Single  No children  Does not work-is on disability  Cigarette smoker of 5 to 10 cigarettes a day for 10 years  Denies any current alcohol or ever being a heavy drinker except for only a heavy drinker at age 23  No current drug use but in the past has used cocaine, marijuana, LSD, oxycodone, and Psilocybin      Family History  Mom-alive-cholecystectomy  Sister-alive-cholecystectomy  No family history of pancreatitis      Allergies  Allergies   Allergen Reactions    Haldol [Haloperidol] Other     Muscle spasms       Review of Systems  1) Anesthetic complications: No known personal or family history of anesthetic complications  2) General: No significant unintentional weight loss or gain, fevers, chills, weakness, fatigue, appetite loss.  3) HEENT: Negative.  4) Cardiac: No angina or leg edema.  5) Pulmonary: No asthma, wheezing, sob.  6) GI: As per HPI.   7) Hematologic: No known personal or family history of bleeding diathesis.  8) Endocrine: No diabetes or thyroid disorders.  9) : No dysuria, hematuria, or frequency.  10) Musculoskeletal: No muscle/bone/joint pain/stiffness/swelling.  11) Neuro: No history of seizures or strokes.  12) Psych: No anxiety.  Positive depression and schizophrenia      Physical Exam  1) VS-noted  "as documented.  2) General-laying in bed in no acute distress. Not septic appearing. Pleasant and cooperative. Looks fine and comfortable.   3) Neuro-Awake, alert, oriented to person, place, and time. Speech is normal. Affect is normal. Follows commands appropriately.  4) HEENT-Head normocephalic and externally atraumatic. Conjunctiva pink. Sclera anicteric. MMM.  5) Heart-RRR.  6) Lungs-Clear on room air. Speaks in complete sentences and does not have any accessory muscle use.  7) Extremities-No edema. The patient's extremities are warm and normal color.  8) Skin-Warm and dry. No diaphoresis or jaundice.  9) Psych-Normal mood. Appropriate affect.   10) Abdomen-Soft. Positive bowel sounds. Non distended.  Nontender except for only right upper quadrant patient rates a 1-2 with no guarding/rebound  No obvious hernias that I appreciate  Negative Lucas's  It does not cause patient any abdominal pain when I shake his bed a little bit  Obese      Vital signs in last 24 hours:  Temp:  [36.6 °C (97.9 °F)-37.1 °C (98.8 °F)] 36.9 °C (98.4 °F)  Heart Rate:  [79-88] 88  Resp:  [16-20] 20  BP: (116-146)/(66-81) 124/81  Heart Rate:  [79-88]   Temp:  [36.6 °C (97.9 °F)-37.1 °C (98.8 °F)]   Resp:  [16-20]   BP: (116-146)/(66-81)   Height:  [170.2 cm (5' 7\")-170.2 cm (5' 7.01\")]   Weight:  [116 kg (255 lb 11.7 oz)-117 kg (257 lb 0.9 oz)]   SpO2:  [95 %-98 %]      Intake/Output last 3 Shifts:  I/O last 3 completed shifts:  In: 2300 (19.7 mL/kg) [IV Piggyback:2300]  Out: - (0 mL/kg)   Weight: 116.6 kg     Scheduled medications  cefTRIAXone, 1 g, intravenous, q24h  famotidine, 20 mg, intravenous, q12h VEDA  FLUoxetine, 20 mg, oral, Nightly  ketorolac, 30 mg, intravenous, q6h VEDA  nicotine, 1 patch, transdermal, Daily  ondansetron, 4 mg, intravenous, q4h      Continuous medications   dextrose 5% lactated Ringer's with KCl 20 mEq/L, 125 mL/hr, Last Rate: 125 mL/hr (04/22/24 0627)      PRN medications  PRN medications: HYDROmorphone, " HYDROmorphone    Relevant Results  Results from last 7 days   Lab Units 04/22/24  0436 04/21/24  1248   WBC AUTO x10*3/uL 14.5* 15.7*   HEMOGLOBIN g/dL 14.5 16.1   HEMATOCRIT % 43.4 46.8   PLATELETS AUTO x10*3/uL 268 305      Results from last 7 days   Lab Units 04/22/24  0436 04/21/24  1248   SODIUM mmol/L 140 140   POTASSIUM mmol/L 4.0 3.5   CHLORIDE mmol/L 105 103   CO2 mmol/L 26 25   BUN mg/dL 7* 11   CREATININE mg/dL 0.80 0.90   GLUCOSE mg/dL 108* 117*   CALCIUM mg/dL 8.8 9.4       US right upper quadrant    Result Date: 4/21/2024  Interpreted By:  Winter Odonnell, STUDY: US RIGHT UPPER QUADRANT;  4/21/2024 3:22 pm   INDICATION: Signs/Symptoms:RUQ pain.   COMPARISON: None.   ACCESSION NUMBER(S): [id number]   ORDERING CLINICIAN: MANUEL GAMEZ   TECHNIQUE: Multiple images of the right upper quadrant were obtained.   FINDINGS: The study is very limited due to patient's body habitus and large amount of bowel gas.   LIVER: The liver measures 16.4 cm and is diffusely echogenic in appearance, consistent with diffuse fatty infiltration. The resulting increased beam attenuation thereby limiting evaluation of the liver for focal lesions. Within the limitations, no focal lesions are seen.     GALLBLADDER: Gallstones are noted. The gallbladder wall is slightly thickened and measures 3.5 mm. no evidence of  surrounding fluid. The gallbladder wall thickness is 3.5 mm. Sonographic Lucas's sign is negative.     BILE DUCTS: No evidence of intra or extrahepatic biliary dilatation is identified; the common bile duct measures 6.5 mm.   PANCREAS: The pancreas is poorly visualized due to overlying bowel gas.   RIGHT KIDNEY: The right kidney measures 10.2 cm in length. The renal cortical echogenicity and thickness are within normal limit.  No hydronephrosis or renal calculi are seen.       Limited exam due to patient's body habitus and large amount of bowel gas.   Fatty infiltration of the liver.   Cholelithiasis and slightly  thickened gallbladder wall without definite sonographic Lucas sign.   MACRO: None   Signed by: Winter Odonnell 4/21/2024 3:53 PM Dictation workstation:   [id number]    CT abdomen pelvis w IV contrast    Result Date: 4/21/2024  Interpreted By:  Anthony Choi, STUDY: CT ABDOMEN PELVIS W IV CONTRAST;  4/21/2024 2:05 pm   INDICATION: Signs/Symptoms:abdominal pain.   COMPARISON: 02/29/2024.   ACCESSION NUMBER(S): [id number]   ORDERING CLINICIAN: MANUEL GAMEZ   TECHNIQUE: CT of the abdomen and pelvis was performed.  Contiguous axial images were obtained at 3 mm slice thickness through the abdomen and pelvis. Coronal and sagittal reconstructions at 3 mm slice thickness were performed. 75 mL Omnipaque 350 administered intravenously without immediate complication.   FINDINGS: LOWER CHEST: Mild bibasilar atelectasis.   ABDOMEN:   LIVER: Steatosis.   BILE DUCTS: Not dilated.   GALLBLADDER: Mildly contracted.   PANCREAS: Mild diffuse peripancreatic edema. Fatty infiltration. No ductal dilatation. No fluid collection.   SPLEEN: Unremarkable   ADRENAL GLANDS: Unremarkable   KIDNEYS AND URETERS: Small nonobstructing calculus left lower pole. Otherwise unremarkable kidneys without hydronephrosis.   PELVIS:   BLADDER: Partially distended.   REPRODUCTIVE ORGANS: No visualized acute abnormality.   BOWEL: No findings of bowel obstruction or inflammation.    Unremarkable appendix.    Unremarkable mesentery.   VESSELS: Aortoiliac system is patent without aneurysm.  Major visceral branches are patent. IVC and major branches are grossly patent. Major portal venous branches are patent.   PERITONEUM AND RETROPERITONEUM: No free fluid or free air.    No abdominal or pelvic lymphadenopathy.   BONE AND ABDOMINAL WALL: Bilateral gluteal subcutaneous nodules perhaps related to prior injection sites,. Mild lumbar degenerative changes.       1.  Mild diffuse acute interstitial edematous pancreatitis without fluid collection. 2. Hepatic  steatosis.   MACRO: None.   Signed by: Anthonygriffin Choi 4/21/2024 2:13 PM Dictation workstation:   [id number]      Assessment/Plan   Principal Problem:    Gallstone pancreatitis ([facility])  4/21 CT abdomen pelvis with IV contrast:  Mild diffuse acute interstitial edematous pancreatitis without fluid collection  4/21 right upper quadrant ultrasound:  Cholelithiasis and slightly thickened gallbladder wall without definite sonographic Lucas sign.  Hemodynamically stable  Currently n.p.o.-and keep n.p.o. even after MRCP for today  Possible clear liquids tomorrow after seen  IV fluids  Is on Rocephin via IM  Pain/nausea meds  I-S  Ambulate in halls  Morning labs ordered  Await results of today's MRCP  Currently on surgery schedule for cholecystectomy this Wednesday-case request placed by surgeon today-it was discussed with the patient that potential risks include but are not limited to bleeding, infection, hernia, open procedure, bile leak, ERCP, IR drainage, CBD injury, injury to surrounding structures, bile salt diarrhea, need for additional procedures, reaction to medication, pulmonary aspiration, recurrent infection, failure to diagnose a condition, creating a complication requiring transfusion or operation.  It was discussed with the patient that his risk is increased due to him currently being a smoker and his weight.      Elevated LFTs  Due to/see above  Improving  Recheck in morning      Leukocytosis  Due to/see above  Improving-14.5 from 15.7  Recheck in morning      Smoker  COPD on 2/29/24 CT  Smoking cessation  I-S  OOB and ambulate in halls      Umbilical hernia  Benign  No acute surgical indications for at present  Per surgeon since will be having cholecystectomy      1109 addendum-seen again concurrently with surgeon      Imelda Parks, APRN-CNP

## 2024-04-22 NOTE — CARE PLAN
The patient's goals for the shift include  pain control    The clinical goals for the shift include Pain control    Over the shift, the patient did not make progress toward the following goals. Barriers to progression include none. Recommendations to address these barriers include none.

## 2024-04-22 NOTE — CARE PLAN
Problem: Pain  Goal: Takes deep breaths with improved pain control throughout the shift  4/21/2024 2327 by Basia Benito RN  Outcome: Progressing  4/21/2024 2325 by Basia Benito RN  Outcome: Progressing  Goal: Turns in bed with improved pain control throughout the shift  4/21/2024 2327 by Basia Benito RN  Outcome: Progressing  4/21/2024 2325 by Basia Benito RN  Outcome: Progressing  Goal: Walks with improved pain control throughout the shift  4/21/2024 2327 by Basia Benito RN  Outcome: Progressing  4/21/2024 2325 by Basia Benito RN  Outcome: Progressing  Goal: Performs ADL's with improved pain control throughout shift  4/21/2024 2327 by Basia Benito RN  Outcome: Progressing  4/21/2024 2325 by Basia Benito RN  Outcome: Progressing  Goal: Participates in PT with improved pain control throughout the shift  4/21/2024 2327 by Basia Benito RN  Outcome: Progressing  4/21/2024 2325 by Basia Benito RN  Outcome: Progressing  Goal: Free from opioid side effects throughout the shift  4/21/2024 2327 by Basia Benito RN  Outcome: Progressing  4/21/2024 2325 by Basia Benito RN  Outcome: Progressing  Goal: Free from acute confusion related to pain meds throughout the shift  4/21/2024 2327 by Basia Benito RN  Outcome: Progressing  4/21/2024 2325 by Basia Benito RN  Outcome: Progressing   The patient's goals for the shift include      The clinical goals for the shift include pain control

## 2024-04-23 LAB
ALBUMIN SERPL-MCNC: 4 G/DL (ref 3.5–5)
ALP BLD-CCNC: 99 U/L (ref 35–125)
ALT SERPL-CCNC: 239 U/L (ref 5–40)
AMYLASE SERPL-CCNC: 141 U/L (ref 28–100)
ANION GAP SERPL CALC-SCNC: 10 MMOL/L
AST SERPL-CCNC: 53 U/L (ref 5–40)
BASOPHILS # BLD AUTO: 0.04 X10*3/UL (ref 0–0.1)
BASOPHILS NFR BLD AUTO: 0.3 %
BILIRUB DIRECT SERPL-MCNC: 0.2 MG/DL (ref 0–0.2)
BILIRUB SERPL-MCNC: 0.8 MG/DL (ref 0.1–1.2)
BUN SERPL-MCNC: 6 MG/DL (ref 8–25)
CALCIUM SERPL-MCNC: 8.7 MG/DL (ref 8.5–10.4)
CHLORIDE SERPL-SCNC: 101 MMOL/L (ref 97–107)
CO2 SERPL-SCNC: 26 MMOL/L (ref 24–31)
CREAT SERPL-MCNC: 0.8 MG/DL (ref 0.4–1.6)
EGFRCR SERPLBLD CKD-EPI 2021: >90 ML/MIN/1.73M*2
EOSINOPHIL # BLD AUTO: 0.28 X10*3/UL (ref 0–0.7)
EOSINOPHIL NFR BLD AUTO: 1.8 %
ERYTHROCYTE [DISTWIDTH] IN BLOOD BY AUTOMATED COUNT: 13.4 % (ref 11.5–14.5)
GLUCOSE SERPL-MCNC: 97 MG/DL (ref 65–99)
HCT VFR BLD AUTO: 42 % (ref 41–52)
HGB BLD-MCNC: 13.6 G/DL (ref 13.5–17.5)
IMM GRANULOCYTES # BLD AUTO: 0.06 X10*3/UL (ref 0–0.7)
IMM GRANULOCYTES NFR BLD AUTO: 0.4 % (ref 0–0.9)
LIPASE SERPL-CCNC: 110 U/L (ref 16–63)
LYMPHOCYTES # BLD AUTO: 2.19 X10*3/UL (ref 1.2–4.8)
LYMPHOCYTES NFR BLD AUTO: 14 %
MCH RBC QN AUTO: 26.2 PG (ref 26–34)
MCHC RBC AUTO-ENTMCNC: 32.4 G/DL (ref 32–36)
MCV RBC AUTO: 81 FL (ref 80–100)
MONOCYTES # BLD AUTO: 1.26 X10*3/UL (ref 0.1–1)
MONOCYTES NFR BLD AUTO: 8.1 %
NEUTROPHILS # BLD AUTO: 11.77 X10*3/UL (ref 1.2–7.7)
NEUTROPHILS NFR BLD AUTO: 75.4 %
NRBC BLD-RTO: 0 /100 WBCS (ref 0–0)
PLATELET # BLD AUTO: 250 X10*3/UL (ref 150–450)
POTASSIUM SERPL-SCNC: 4.3 MMOL/L (ref 3.4–5.1)
PROT SERPL-MCNC: 7.1 G/DL (ref 5.9–7.9)
RBC # BLD AUTO: 5.19 X10*6/UL (ref 4.5–5.9)
SODIUM SERPL-SCNC: 137 MMOL/L (ref 133–145)
WBC # BLD AUTO: 15.6 X10*3/UL (ref 4.4–11.3)

## 2024-04-23 PROCEDURE — 82248 BILIRUBIN DIRECT: CPT | Performed by: NURSE PRACTITIONER

## 2024-04-23 PROCEDURE — 36415 COLL VENOUS BLD VENIPUNCTURE: CPT | Performed by: NURSE PRACTITIONER

## 2024-04-23 PROCEDURE — 2500000002 HC RX 250 W HCPCS SELF ADMINISTERED DRUGS (ALT 637 FOR MEDICARE OP, ALT 636 FOR OP/ED): Performed by: INTERNAL MEDICINE

## 2024-04-23 PROCEDURE — 2500000004 HC RX 250 GENERAL PHARMACY W/ HCPCS (ALT 636 FOR OP/ED): Performed by: INTERNAL MEDICINE

## 2024-04-23 PROCEDURE — 2500000004 HC RX 250 GENERAL PHARMACY W/ HCPCS (ALT 636 FOR OP/ED): Performed by: NURSE PRACTITIONER

## 2024-04-23 PROCEDURE — S4991 NICOTINE PATCH NONLEGEND: HCPCS | Performed by: INTERNAL MEDICINE

## 2024-04-23 PROCEDURE — 85025 COMPLETE CBC W/AUTO DIFF WBC: CPT | Performed by: NURSE PRACTITIONER

## 2024-04-23 PROCEDURE — 99232 SBSQ HOSP IP/OBS MODERATE 35: CPT | Performed by: SURGERY

## 2024-04-23 PROCEDURE — 1100000001 HC PRIVATE ROOM DAILY

## 2024-04-23 PROCEDURE — 83690 ASSAY OF LIPASE: CPT | Performed by: NURSE PRACTITIONER

## 2024-04-23 PROCEDURE — 2500000001 HC RX 250 WO HCPCS SELF ADMINISTERED DRUGS (ALT 637 FOR MEDICARE OP)

## 2024-04-23 PROCEDURE — 82150 ASSAY OF AMYLASE: CPT | Performed by: NURSE PRACTITIONER

## 2024-04-23 RX ORDER — CEFAZOLIN SODIUM 2 G/100ML
2 INJECTION, SOLUTION INTRAVENOUS ONCE
Status: COMPLETED | OUTPATIENT
Start: 2024-04-24 | End: 2024-04-24

## 2024-04-23 RX ADMIN — CEFTRIAXONE SODIUM 1 G: 1 INJECTION, SOLUTION INTRAVENOUS at 15:44

## 2024-04-23 RX ADMIN — HYDROMORPHONE HYDROCHLORIDE 1 MG: 1 INJECTION, SOLUTION INTRAMUSCULAR; INTRAVENOUS; SUBCUTANEOUS at 08:28

## 2024-04-23 RX ADMIN — HYDROMORPHONE HYDROCHLORIDE 1 MG: 1 INJECTION, SOLUTION INTRAMUSCULAR; INTRAVENOUS; SUBCUTANEOUS at 05:10

## 2024-04-23 RX ADMIN — HYDROMORPHONE HYDROCHLORIDE 1 MG: 1 INJECTION, SOLUTION INTRAMUSCULAR; INTRAVENOUS; SUBCUTANEOUS at 14:50

## 2024-04-23 RX ADMIN — ONDANSETRON HYDROCHLORIDE 4 MG: 2 INJECTION INTRAMUSCULAR; INTRAVENOUS at 21:22

## 2024-04-23 RX ADMIN — ONDANSETRON HYDROCHLORIDE 4 MG: 2 INJECTION INTRAMUSCULAR; INTRAVENOUS at 05:15

## 2024-04-23 RX ADMIN — HYDROMORPHONE HYDROCHLORIDE 0.6 MG: 1 INJECTION, SOLUTION INTRAMUSCULAR; INTRAVENOUS; SUBCUTANEOUS at 02:47

## 2024-04-23 RX ADMIN — DEXTROSE MONOHYDRATE, SODIUM CHLORIDE, SODIUM LACTATE, POTASSIUM CHLORIDE, CALCIUM CHLORIDE 125 ML/HR: 5; 600; 310; 179; 20 INJECTION, SOLUTION INTRAVENOUS at 18:46

## 2024-04-23 RX ADMIN — ONDANSETRON HYDROCHLORIDE 4 MG: 2 INJECTION INTRAMUSCULAR; INTRAVENOUS at 17:51

## 2024-04-23 RX ADMIN — FAMOTIDINE 20 MG: 10 INJECTION, SOLUTION INTRAVENOUS at 20:55

## 2024-04-23 RX ADMIN — ONDANSETRON HYDROCHLORIDE 4 MG: 2 INJECTION INTRAMUSCULAR; INTRAVENOUS at 14:49

## 2024-04-23 RX ADMIN — FLUOXETINE HYDROCHLORIDE 20 MG: 20 CAPSULE ORAL at 20:55

## 2024-04-23 RX ADMIN — ONDANSETRON HYDROCHLORIDE 4 MG: 2 INJECTION INTRAMUSCULAR; INTRAVENOUS at 02:47

## 2024-04-23 RX ADMIN — FAMOTIDINE 20 MG: 10 INJECTION, SOLUTION INTRAVENOUS at 08:27

## 2024-04-23 RX ADMIN — HYDROMORPHONE HYDROCHLORIDE 1 MG: 1 INJECTION, SOLUTION INTRAMUSCULAR; INTRAVENOUS; SUBCUTANEOUS at 20:55

## 2024-04-23 RX ADMIN — HYDROMORPHONE HYDROCHLORIDE 1 MG: 1 INJECTION, SOLUTION INTRAMUSCULAR; INTRAVENOUS; SUBCUTANEOUS at 10:56

## 2024-04-23 RX ADMIN — Medication 1 PATCH: at 08:27

## 2024-04-23 RX ADMIN — ONDANSETRON HYDROCHLORIDE 4 MG: 2 INJECTION INTRAMUSCULAR; INTRAVENOUS at 10:53

## 2024-04-23 RX ADMIN — DEXTROSE MONOHYDRATE, SODIUM CHLORIDE, SODIUM LACTATE, POTASSIUM CHLORIDE, CALCIUM CHLORIDE 125 ML/HR: 5; 600; 310; 179; 20 INJECTION, SOLUTION INTRAVENOUS at 08:22

## 2024-04-23 RX ADMIN — HYDROMORPHONE HYDROCHLORIDE 1 MG: 1 INJECTION, SOLUTION INTRAMUSCULAR; INTRAVENOUS; SUBCUTANEOUS at 17:51

## 2024-04-23 SDOH — SOCIAL STABILITY: SOCIAL NETWORK
IN A TYPICAL WEEK, HOW MANY TIMES DO YOU TALK ON THE PHONE WITH FAMILY, FRIENDS, OR NEIGHBORS?: MORE THAN THREE TIMES A WEEK

## 2024-04-23 SDOH — ECONOMIC STABILITY: TRANSPORTATION INSECURITY
IN THE PAST 12 MONTHS, HAS LACK OF TRANSPORTATION KEPT YOU FROM MEETINGS, WORK, OR FROM GETTING THINGS NEEDED FOR DAILY LIVING?: NO

## 2024-04-23 SDOH — ECONOMIC STABILITY: TRANSPORTATION INSECURITY
IN THE PAST 12 MONTHS, HAS THE LACK OF TRANSPORTATION KEPT YOU FROM MEDICAL APPOINTMENTS OR FROM GETTING MEDICATIONS?: NO

## 2024-04-23 SDOH — HEALTH STABILITY: MENTAL HEALTH
HOW OFTEN DO YOU NEED TO HAVE SOMEONE HELP YOU WHEN YOU READ INSTRUCTIONS, PAMPHLETS, OR OTHER WRITTEN MATERIAL FROM YOUR DOCTOR OR PHARMACY?: NEVER

## 2024-04-23 SDOH — SOCIAL STABILITY: SOCIAL NETWORK
DO YOU BELONG TO ANY CLUBS OR ORGANIZATIONS SUCH AS CHURCH GROUPS UNIONS, FRATERNAL OR ATHLETIC GROUPS, OR SCHOOL GROUPS?: NO

## 2024-04-23 SDOH — HEALTH STABILITY: MENTAL HEALTH: HOW MANY STANDARD DRINKS CONTAINING ALCOHOL DO YOU HAVE ON A TYPICAL DAY?: PATIENT DOES NOT DRINK

## 2024-04-23 SDOH — ECONOMIC STABILITY: FOOD INSECURITY: WITHIN THE PAST 12 MONTHS, YOU WORRIED THAT YOUR FOOD WOULD RUN OUT BEFORE YOU GOT MONEY TO BUY MORE.: NEVER TRUE

## 2024-04-23 SDOH — ECONOMIC STABILITY: INCOME INSECURITY: IN THE PAST 12 MONTHS, HAS THE ELECTRIC, GAS, OIL, OR WATER COMPANY THREATENED TO SHUT OFF SERVICE IN YOUR HOME?: NO

## 2024-04-23 SDOH — SOCIAL STABILITY: SOCIAL INSECURITY
WITHIN THE LAST YEAR, HAVE YOU BEEN KICKED, HIT, SLAPPED, OR OTHERWISE PHYSICALLY HURT BY YOUR PARTNER OR EX-PARTNER?: NO

## 2024-04-23 SDOH — ECONOMIC STABILITY: INCOME INSECURITY: HOW HARD IS IT FOR YOU TO PAY FOR THE VERY BASICS LIKE FOOD, HOUSING, MEDICAL CARE, AND HEATING?: NOT HARD AT ALL

## 2024-04-23 SDOH — SOCIAL STABILITY: SOCIAL NETWORK: HOW OFTEN DO YOU GET TOGETHER WITH FRIENDS OR RELATIVES?: MORE THAN THREE TIMES A WEEK

## 2024-04-23 SDOH — ECONOMIC STABILITY: HOUSING INSECURITY
IN THE LAST 12 MONTHS, WAS THERE A TIME WHEN YOU DID NOT HAVE A STEADY PLACE TO SLEEP OR SLEPT IN A SHELTER (INCLUDING NOW)?: NO

## 2024-04-23 SDOH — HEALTH STABILITY: PHYSICAL HEALTH: ON AVERAGE, HOW MANY MINUTES DO YOU ENGAGE IN EXERCISE AT THIS LEVEL?: 20 MIN

## 2024-04-23 SDOH — SOCIAL STABILITY: SOCIAL NETWORK: HOW OFTEN DO YOU ATTEND CHURCH OR RELIGIOUS SERVICES?: NEVER

## 2024-04-23 SDOH — SOCIAL STABILITY: SOCIAL NETWORK: HOW OFTEN DO YOU ATTENT MEETINGS OF THE CLUB OR ORGANIZATION YOU BELONG TO?: NEVER

## 2024-04-23 SDOH — SOCIAL STABILITY: SOCIAL INSECURITY: WITHIN THE LAST YEAR, HAVE YOU BEEN HUMILIATED OR EMOTIONALLY ABUSED IN OTHER WAYS BY YOUR PARTNER OR EX-PARTNER?: NO

## 2024-04-23 SDOH — HEALTH STABILITY: MENTAL HEALTH: HOW OFTEN DO YOU HAVE A DRINK CONTAINING ALCOHOL?: NEVER

## 2024-04-23 SDOH — SOCIAL STABILITY: SOCIAL INSECURITY: WITHIN THE LAST YEAR, HAVE YOU BEEN AFRAID OF YOUR PARTNER OR EX-PARTNER?: NO

## 2024-04-23 SDOH — SOCIAL STABILITY: SOCIAL NETWORK: ARE YOU MARRIED, WIDOWED, DIVORCED, SEPARATED, NEVER MARRIED, OR LIVING WITH A PARTNER?: NEVER MARRIED

## 2024-04-23 SDOH — SOCIAL STABILITY: SOCIAL INSECURITY
WITHIN THE LAST YEAR, HAVE TO BEEN RAPED OR FORCED TO HAVE ANY KIND OF SEXUAL ACTIVITY BY YOUR PARTNER OR EX-PARTNER?: NO

## 2024-04-23 SDOH — HEALTH STABILITY: MENTAL HEALTH: HOW OFTEN DO YOU HAVE 6 OR MORE DRINKS ON ONE OCCASION?: NEVER

## 2024-04-23 SDOH — HEALTH STABILITY: MENTAL HEALTH
STRESS IS WHEN SOMEONE FEELS TENSE, NERVOUS, ANXIOUS, OR CAN'T SLEEP AT NIGHT BECAUSE THEIR MIND IS TROUBLED. HOW STRESSED ARE YOU?: NOT AT ALL

## 2024-04-23 SDOH — ECONOMIC STABILITY: INCOME INSECURITY: IN THE LAST 12 MONTHS, WAS THERE A TIME WHEN YOU WERE NOT ABLE TO PAY THE MORTGAGE OR RENT ON TIME?: NO

## 2024-04-23 SDOH — HEALTH STABILITY: PHYSICAL HEALTH: ON AVERAGE, HOW MANY DAYS PER WEEK DO YOU ENGAGE IN MODERATE TO STRENUOUS EXERCISE (LIKE A BRISK WALK)?: 2 DAYS

## 2024-04-23 ASSESSMENT — PAIN DESCRIPTION - ORIENTATION
ORIENTATION: LEFT

## 2024-04-23 ASSESSMENT — PAIN DESCRIPTION - LOCATION
LOCATION: ABDOMEN

## 2024-04-23 ASSESSMENT — COGNITIVE AND FUNCTIONAL STATUS - GENERAL
DAILY ACTIVITIY SCORE: 24
MOBILITY SCORE: 24
MOBILITY SCORE: 24
DAILY ACTIVITIY SCORE: 24

## 2024-04-23 ASSESSMENT — PAIN SCALES - GENERAL
PAINLEVEL_OUTOF10: 5 - MODERATE PAIN
PAINLEVEL_OUTOF10: 7
PAINLEVEL_OUTOF10: 3
PAINLEVEL_OUTOF10: 7
PAINLEVEL_OUTOF10: 7
PAINLEVEL_OUTOF10: 2
PAINLEVEL_OUTOF10: 7
PAINLEVEL_OUTOF10: 3
PAINLEVEL_OUTOF10: 6
PAINLEVEL_OUTOF10: 3
PAINLEVEL_OUTOF10: 1
PAINLEVEL_OUTOF10: 1

## 2024-04-23 ASSESSMENT — LIFESTYLE VARIABLES
AUDIT-C TOTAL SCORE: 0
SKIP TO QUESTIONS 9-10: 1

## 2024-04-23 ASSESSMENT — ACTIVITIES OF DAILY LIVING (ADL): LACK_OF_TRANSPORTATION: NO

## 2024-04-23 NOTE — CARE PLAN
The patient's goals for the shift include      The clinical goals for the shift include control pain    Over the shift, the patient did not make progress toward the following goals. Barriers to progression include none. Recommendations to address these barriers include none.

## 2024-04-23 NOTE — CARE PLAN
The patient's goals for the shift include      The clinical goals for the shift include Pain control      Problem: Pain  Goal: Takes deep breaths with improved pain control throughout the shift  Outcome: Progressing  Goal: Turns in bed with improved pain control throughout the shift  Outcome: Progressing  Goal: Walks with improved pain control throughout the shift  Outcome: Progressing  Goal: Performs ADL's with improved pain control throughout shift  Outcome: Progressing  Goal: Participates in PT with improved pain control throughout the shift  Outcome: Progressing  Goal: Free from opioid side effects throughout the shift  Outcome: Progressing  Goal: Free from acute confusion related to pain meds throughout the shift  Outcome: Progressing

## 2024-04-23 NOTE — PROGRESS NOTES
"Jhonatan Huffman is a 28 y.o. male on day 2 of admission presenting with Gallstone pancreatitis ([facility]).    Subjective   Seen and examined patient's pain is improving MRCP shows no evidence of obstruction just biliary sludge patient is slated for surgery for cholecystectomy tomorrow       Objective     Physical Exam  Vitals and nursing note reviewed.   Constitutional:       Appearance: Normal appearance. He is obese.   HENT:      Head: Normocephalic and atraumatic.      Mouth/Throat:      Mouth: Mucous membranes are moist.   Eyes:      Extraocular Movements: Extraocular movements intact.      Pupils: Pupils are equal, round, and reactive to light.   Cardiovascular:      Rate and Rhythm: Normal rate and regular rhythm.      Pulses: Normal pulses.      Heart sounds: Normal heart sounds.   Pulmonary:      Effort: Pulmonary effort is normal.      Breath sounds: Normal breath sounds.   Abdominal:      General: Abdomen is flat.      Palpations: Abdomen is soft.   Musculoskeletal:         General: Normal range of motion.      Cervical back: Normal range of motion and neck supple.   Skin:     General: Skin is warm and dry.      Capillary Refill: Capillary refill takes less than 2 seconds.   Neurological:      General: No focal deficit present.      Mental Status: He is alert and oriented to person, place, and time. Mental status is at baseline.   Psychiatric:         Mood and Affect: Mood normal.         Last Recorded Vitals  Blood pressure 123/75, pulse 93, temperature 36.8 °C (98.2 °F), temperature source Oral, resp. rate 20, height 1.702 m (5' 7.01\"), weight 117 kg (257 lb 0.9 oz), SpO2 96%.  Intake/Output last 3 Shifts:  I/O last 3 completed shifts:  In: 4881.3 (41.9 mL/kg) [IV Piggyback:4881.3]  Out: - (0 mL/kg)   Weight: 116.6 kg     Relevant Results              Narrative & Impression   Interpreted By:  Eric Crow,   STUDY:  MRCP PANCREAS WO IV CONTRAST;  4/22/2024 10:38 pm      INDICATION:  Concern for " choledocholithiasis.      COMPARISON:  Ultrasound and CT 04/21/2024      ACCESSION NUMBER(S):  [id number]      ORDERING CLINICIAN:  ELVIRA RUTLEDGE      TECHNIQUE:  MRCP without intravenous contrast.      FINDINGS:  Biliary: Cholelithiasis with 4 stones near the neck measuring 9-11  mm. No gallbladder wall thickening or pericholecystic inflammation or  fluid. There are 2 tiny apparent filling defects in the distal CBD  (series 13, image 6). There is no intrahepatic or extrahepatic bile  duct dilation.      Pancreas: Trace peripancreatic inflammation and edema, improved since  yesterday's CT. No peripancreatic fluid collection. No main duct  dilation or mass. Fatty interdigitation.      Liver: No mass. Hepatic steatosis.      Spleen: No mass. No splenomegaly.      Adrenals: Normal.      Kidneys: No solid or cystic mass. No hydronephrosis.      GI tract: No bowel wall thickening or dilation.      Lymph nodes: No lymphadenopathy.      Mesentery/peritoneum: No ascites or fluid collection.      Vasculature: No abdominal aortic aneurysm.      Bones/Lower chest: No suspicious marrow replacing process.No pleural  effusion.      IMPRESSION:  Cholelithiasis and possible nonobstructing distal CBD  cholelithiasis/sludge without biliary obstruction or evidence of  acute cholecystitis.      Nearly resolved acute interstitial pancreatitis without acute  peripancreatic fluid collection.      MACRO  None      Signed by: Eric Crow 4/23/2024 8:22 AM  Dictation workstation:   [id number]     Results for orders placed or performed during the hospital encounter of 04/21/24 (from the past 24 hour(s))   Amylase   Result Value Ref Range    Amylase 141 (H) 28 - 100 U/L   Lipase   Result Value Ref Range    Lipase 110 (H) 16 - 63 U/L   Hepatic function panel   Result Value Ref Range    AST 53 (H) 5 - 40 U/L     (H) 5 - 40 U/L    Alkaline Phosphatase 99 35 - 125 U/L    Bilirubin, Total 0.8 0.1 - 1.2 mg/dL    Bilirubin, Direct 0.2  0.0 - 0.2 mg/dL    Total Protein 7.1 5.9 - 7.9 g/dL    Albumin 4.0 3.5 - 5.0 g/dL   Basic Metabolic Panel   Result Value Ref Range    Glucose 97 65 - 99 mg/dL    Sodium 137 133 - 145 mmol/L    Potassium 4.3 3.4 - 5.1 mmol/L    Chloride 101 97 - 107 mmol/L    Bicarbonate 26 24 - 31 mmol/L    Urea Nitrogen 6 (L) 8 - 25 mg/dL    Creatinine 0.80 0.40 - 1.60 mg/dL    eGFR >90 >60 mL/min/1.73m*2    Calcium 8.7 8.5 - 10.4 mg/dL    Anion Gap 10 <=19 mmol/L   CBC and Auto Differential   Result Value Ref Range    WBC 15.6 (H) 4.4 - 11.3 x10*3/uL    nRBC 0.0 0.0 - 0.0 /100 WBCs    RBC 5.19 4.50 - 5.90 x10*6/uL    Hemoglobin 13.6 13.5 - 17.5 g/dL    Hematocrit 42.0 41.0 - 52.0 %    MCV 81 80 - 100 fL    MCH 26.2 26.0 - 34.0 pg    MCHC 32.4 32.0 - 36.0 g/dL    RDW 13.4 11.5 - 14.5 %    Platelets 250 150 - 450 x10*3/uL    Neutrophils % 75.4 40.0 - 80.0 %    Immature Granulocytes %, Automated 0.4 0.0 - 0.9 %    Lymphocytes % 14.0 13.0 - 44.0 %    Monocytes % 8.1 2.0 - 10.0 %    Eosinophils % 1.8 0.0 - 6.0 %    Basophils % 0.3 0.0 - 2.0 %    Neutrophils Absolute 11.77 (H) 1.20 - 7.70 x10*3/uL    Immature Granulocytes Absolute, Automated 0.06 0.00 - 0.70 x10*3/uL    Lymphocytes Absolute 2.19 1.20 - 4.80 x10*3/uL    Monocytes Absolute 1.26 (H) 0.10 - 1.00 x10*3/uL    Eosinophils Absolute 0.28 0.00 - 0.70 x10*3/uL    Basophils Absolute 0.04 0.00 - 0.10 x10*3/uL                      Assessment/Plan   Principal Problem:    Gallstone pancreatitis ([facility]-H[facility])    MRCP detailed above plan for surgical intervention and cholecystectomy tomorrow anticipated discharge postoperatively when patient is tolerating a diet either late tomorrow afternoon if surgery is early in the day or Thursday morning if the surgery is late on Wednesday       I spent 45 minutes in the professional and overall care of this patient.      Wilmer Hillman, DO

## 2024-04-23 NOTE — PROGRESS NOTES
04/23/24 1922   [facility] Disability Status   Are you deaf or do you have serious difficulty hearing? N   Are you blind or do you have serious difficulty seeing, even when wearing glasses? N   Because of a physical, mental, or emotional condition, do you have serious difficulty concentrating, remembering, or making decisions? (5 years old or older) N   Do you have serious difficulty walking or climbing stairs? N   Do you have serious difficulty dressing or bathing? N   Because of a physical, mental, or emotional condition, do you have serious difficulty doing errands alone such as visiting the doctor? N

## 2024-04-23 NOTE — PROGRESS NOTES
04/23/24 1336   Physical Activity   On average, how many days per week do you engage in moderate to strenuous exercise (like a brisk walk)? 2 days   On average, how many minutes do you engage in exercise at this level? 20 min   Financial Resource Strain   How hard is it for you to pay for the very basics like food, housing, medical care, and heating? Not hard   Housing Stability   In the last 12 months, was there a time when you were not able to pay the mortgage or rent on time? N   In the last 12 months, was there a time when you did not have a steady place to sleep or slept in a shelter (including now)? N   Transportation Needs   In the past 12 months, has lack of transportation kept you from medical appointments or from getting medications? no   In the past 12 months, has lack of transportation kept you from meetings, work, or from getting things needed for daily living? No   Food Insecurity   Within the past 12 months, you worried that your food would run out before you got the money to buy more. Never true   Stress   Do you feel stress - tense, restless, nervous, or anxious, or unable to sleep at night because your mind is troubled all the time - these days? Not at all   Social Connections   In a typical week, how many times do you talk on the phone with family, friends, or neighbors? More than 3   How often do you get together with friends or relatives? More than 3   How often do you attend Evangelical or Latter day services? Never   Do you belong to any clubs or organizations such as Evangelical groups, unions, fraternal or athletic groups, or school groups? No   How often do you attend meetings of the clubs or organizations you belong to? Never   Are you , , , , never , or living with a partner? Never marrie   Intimate Partner Violence   Within the last year, have you been afraid of your partner or ex-partner? No   Within the last year, have you been humiliated or emotionally  abused in other ways by your partner or ex-partner? No   Within the last year, have you been kicked, hit, slapped, or otherwise physically hurt by your partner or ex-partner? No   Within the last year, have you been raped or forced to have any kind of sexual activity by your partner or ex-partner? No   Alcohol Use   Q1: How often do you have a drink containing alcohol? Never   Q2: How many drinks containing alcohol do you have on a typical day when you are drinking? None   Q3: How often do you have six or more drinks on one occasion? Never   Utilities   In the past 12 months has the electric, gas, oil, or water company threatened to shut off services in your home? No   Health Literacy   How often do you need to have someone help you when you read instructions, pamphlets, or other written material from your doctor or pharmacy? Never     He reports that he has been smoking cigarettes. He has a 8 pack-year smoking history. He has never used smokeless tobacco. He reports that he does not currently use alcohol. He reports that he does not currently use drugs after having used the following drugs: Cocaine, Marijuana, LSD, Oxycodone, and Psilocybin.

## 2024-04-23 NOTE — PROGRESS NOTES
04/23/24 1333   [facility] Disability Status   Are you deaf or do you have serious difficulty hearing? N   Are you blind or do you have serious difficulty seeing, even when wearing glasses? N   Because of a physical, mental, or emotional condition, do you have serious difficulty concentrating, remembering, or making decisions? (5 years old or older) N   Do you have serious difficulty walking or climbing stairs? N   Do you have serious difficulty dressing or bathing? N   Because of a physical, mental, or emotional condition, do you have serious difficulty doing errands alone such as visiting the doctor? N

## 2024-04-23 NOTE — PROGRESS NOTES
Jhonatan Huffman is a 28 y.o. male on day 2 of admission presenting with Gallstone pancreatitis ([facility]-HCC).    Subjective   Feels good and better.  No abdominal pain, nausea vomiting since seen yesterday.  Breathing feels good and normal.  Passing gas.  Had a bowel movement yesterday and this morning.  No chills.  Some sweats.  No angina, shortness of breath, bleeding from anywhere.  Ambulating in the halls and using his I-S.  Voiding fine without any issues/dysuria/blood.  Objective     Vital signs in last 24 hours:  Temp:  [36.6 °C (97.9 °F)-37.1 °C (98.8 °F)] 36.8 °C (98.2 °F)  Heart Rate:  [] 93  Resp:  [16-21] 20  BP: (118-140)/(64-78) 123/75  Heart Rate:  []   Temp:  [36.6 °C (97.9 °F)-37.1 °C (98.8 °F)]   Resp:  [16-21]   BP: (118-140)/(64-78)   SpO2:  [96 %-98 %]      Intake/Output last 3 Shifts:  I/O last 3 completed shifts:  In: 4881.3 (41.9 mL/kg) [IV Piggyback:4881.3]  Out: - (0 mL/kg)   Weight: 116.6 kg       Physical Exam  No acute distress  Nonseptic appearing  Looks fine and comfortable  Alert and oriented  Heart regular  Lungs clear  No edema  Abdomen soft, positive bowel sounds, nondistended, nontender  Negative Lucas's  No abdominal pain when I shake his abdomen a little bit-but says that it causes a little left flank pain-no CVA tenderness bilaterally-has the left nonobstructing nephrolithiasis on CT-which was previously discussed with him and reiterated again  Umbilical hernia benign      Scheduled medications  cefTRIAXone, 1 g, intravenous, q24h  famotidine, 20 mg, intravenous, q12h VDEA  FLUoxetine, 20 mg, oral, Nightly  nicotine, 1 patch, transdermal, Daily  ondansetron, 4 mg, intravenous, q4h      Continuous medications   dextrose 5% lactated Ringer's with KCl 20 mEq/L, 125 mL/hr, Last Rate: 125 mL/hr (04/23/24 0822)      PRN medications  PRN medications: HYDROmorphone, HYDROmorphone, ketorolac    Relevant Results  Results from last 7 days   Lab Units 04/23/24  0422 04/22/24  0376  04/21/24  1248   WBC AUTO x10*3/uL 15.6* 14.5* 15.7*   HEMOGLOBIN g/dL 13.6 14.5 16.1   HEMATOCRIT % 42.0 43.4 46.8   PLATELETS AUTO x10*3/uL 250 268 305      Results from last 7 days   Lab Units 04/23/24  0422 04/22/24  0436 04/21/24  1248   SODIUM mmol/L 137 140 140   POTASSIUM mmol/L 4.3 4.0 3.5   CHLORIDE mmol/L 101 105 103   CO2 mmol/L 26 26 25   BUN mg/dL 6* 7* 11   CREATININE mg/dL 0.80 0.80 0.90   GLUCOSE mg/dL 97 108* 117*   CALCIUM mg/dL 8.7 8.8 9.4       MRCP pancreas wo IV contrast    Result Date: 4/23/2024  Interpreted By:  Eric Crow, STUDY: MRCP PANCREAS WO IV CONTRAST;  4/22/2024 10:38 pm   INDICATION: Concern for choledocholithiasis.   COMPARISON: Ultrasound and CT 04/21/2024   ACCESSION NUMBER(S): [id number]   ORDERING CLINICIAN: ELVIRA RUTLEDGE   TECHNIQUE: MRCP without intravenous contrast.   FINDINGS: Biliary: Cholelithiasis with 4 stones near the neck measuring 9-11 mm. No gallbladder wall thickening or pericholecystic inflammation or fluid. There are 2 tiny apparent filling defects in the distal CBD (series 13, image 6). There is no intrahepatic or extrahepatic bile duct dilation.   Pancreas: Trace peripancreatic inflammation and edema, improved since yesterday's CT. No peripancreatic fluid collection. No main duct dilation or mass. Fatty interdigitation.   Liver: No mass. Hepatic steatosis.   Spleen: No mass. No splenomegaly.   Adrenals: Normal.   Kidneys: No solid or cystic mass. No hydronephrosis.   GI tract: No bowel wall thickening or dilation.   Lymph nodes: No lymphadenopathy.   Mesentery/peritoneum: No ascites or fluid collection.   Vasculature: No abdominal aortic aneurysm.   Bones/Lower chest: No suspicious marrow replacing process.No pleural effusion.       Cholelithiasis and possible nonobstructing distal CBD cholelithiasis/sludge without biliary obstruction or evidence of acute cholecystitis.   Nearly resolved acute interstitial pancreatitis without acute peripancreatic fluid  collection.   MACRO None   Signed by: Eric Crow 4/23/2024 8:22 AM Dictation workstation:   [id number]    US right upper quadrant    Result Date: 4/21/2024  Interpreted By:  Winter Odonnell, STUDY: US RIGHT UPPER QUADRANT;  4/21/2024 3:22 pm   INDICATION: Signs/Symptoms:RUQ pain.   COMPARISON: None.   ACCESSION NUMBER(S): [id number]   ORDERING CLINICIAN: MANUEL GAMEZ   TECHNIQUE: Multiple images of the right upper quadrant were obtained.   FINDINGS: The study is very limited due to patient's body habitus and large amount of bowel gas.   LIVER: The liver measures 16.4 cm and is diffusely echogenic in appearance, consistent with diffuse fatty infiltration. The resulting increased beam attenuation thereby limiting evaluation of the liver for focal lesions. Within the limitations, no focal lesions are seen.     GALLBLADDER: Gallstones are noted. The gallbladder wall is slightly thickened and measures 3.5 mm. no evidence of  surrounding fluid. The gallbladder wall thickness is 3.5 mm. Sonographic Lucas's sign is negative.     BILE DUCTS: No evidence of intra or extrahepatic biliary dilatation is identified; the common bile duct measures 6.5 mm.   PANCREAS: The pancreas is poorly visualized due to overlying bowel gas.   RIGHT KIDNEY: The right kidney measures 10.2 cm in length. The renal cortical echogenicity and thickness are within normal limit.  No hydronephrosis or renal calculi are seen.       Limited exam due to patient's body habitus and large amount of bowel gas.   Fatty infiltration of the liver.   Cholelithiasis and slightly thickened gallbladder wall without definite sonographic Lucas sign.   MACRO: None   Signed by: Winter Odonnell 4/21/2024 3:53 PM Dictation workstation:   [id number]    CT abdomen pelvis w IV contrast    Result Date: 4/21/2024  Interpreted By:  Anthony Choi, STUDY: CT ABDOMEN PELVIS W IV CONTRAST;  4/21/2024 2:05 pm   INDICATION: Signs/Symptoms:abdominal pain.   COMPARISON:  02/29/2024.   ACCESSION NUMBER(S): [id number]   ORDERING CLINICIAN: MANUEL GAMEZ   TECHNIQUE: CT of the abdomen and pelvis was performed.  Contiguous axial images were obtained at 3 mm slice thickness through the abdomen and pelvis. Coronal and sagittal reconstructions at 3 mm slice thickness were performed. 75 mL Omnipaque 350 administered intravenously without immediate complication.   FINDINGS: LOWER CHEST: Mild bibasilar atelectasis.   ABDOMEN:   LIVER: Steatosis.   BILE DUCTS: Not dilated.   GALLBLADDER: Mildly contracted.   PANCREAS: Mild diffuse peripancreatic edema. Fatty infiltration. No ductal dilatation. No fluid collection.   SPLEEN: Unremarkable   ADRENAL GLANDS: Unremarkable   KIDNEYS AND URETERS: Small nonobstructing calculus left lower pole. Otherwise unremarkable kidneys without hydronephrosis.   PELVIS:   BLADDER: Partially distended.   REPRODUCTIVE ORGANS: No visualized acute abnormality.   BOWEL: No findings of bowel obstruction or inflammation.    Unremarkable appendix.    Unremarkable mesentery.   VESSELS: Aortoiliac system is patent without aneurysm.  Major visceral branches are patent. IVC and major branches are grossly patent. Major portal venous branches are patent.   PERITONEUM AND RETROPERITONEUM: No free fluid or free air.    No abdominal or pelvic lymphadenopathy.   BONE AND ABDOMINAL WALL: Bilateral gluteal subcutaneous nodules perhaps related to prior injection sites,. Mild lumbar degenerative changes.       1.  Mild diffuse acute interstitial edematous pancreatitis without fluid collection. 2. Hepatic steatosis.   MACRO: None.   Signed by: Anthony Choi 4/21/2024 2:13 PM Dictation workstation:   [id number]      Assessment/Plan   Principal Problem:  Gallstone pancreatitis ([facility]CC)  4/21 CT abdomen pelvis with IV contrast:  Mild diffuse acute interstitial edematous pancreatitis without fluid collection  4/21 right upper quadrant ultrasound:  Cholelithiasis and slightly thickened  gallbladder wall without definite sonographic Lucas sign.  4/22 MRCP:  Cholelithiasis and possible nonobstructing distal CBD cholelithiasis/sludge without biliary obstruction or evidence of acute cholecystitis.   Nearly resolved acute interstitial pancreatitis without acute peripancreatic fluid collection.  Hemodynamically stable  Amylase 141 from 467  Lipase 110 from 856  N.p.o.  Possible clear liquids today after discussed with/seen by surgeon  N.p.o. after midnight for surgery tomorrow at 1130   Case request placed by surgeon 4/22  Consent obtained and placed on chart 4/22  IV fluids  Is on Rocephin via IM  Pain/nausea meds  I-S  Ambulate in halls  Morning labs ordered      Elevated LFTs  Due to/see above  Improving   from 398  AST 53 from 169  The rest of LFTs normal  Recheck in morning      Leukocytosis  Due to/see above  15.6 from 14.5  Recheck in morning      Smoker  COPD on 2/29/24 CT  I-S  OOB and ambulate in halls  Discussed with patient that COPD was found on his CT and advised smoking cessation and discussed with patient I ordered smoking cessation for him to help him quit      Umbilical hernia  Benign  No acute surgical indications for at present  Per surgeon since will be having cholecystectomy      Imelda Parks, APRN-CNP

## 2024-04-23 NOTE — PROGRESS NOTES
04/23/24 1333   Discharge Planning   Living Arrangements Family members  (lives with grandparents)   Support Systems Parent;Family members   Assistance Needed Independent   Type of Residence Private residence  (2 story home)   Number of Stairs to Enter Residence 3   Number of Stairs Within Residence   (1 flight of stairs)   Do you have animals or pets at home? Yes   Type of Animals or Pets dogs   Home or Post Acute Services None   Patient expects to be discharged to: Home with Grandparents   Does the patient need discharge transport arranged? No   Financial Resource Strain   How hard is it for you to pay for the very basics like food, housing, medical care, and heating? Not hard   Housing Stability   In the last 12 months, was there a time when you were not able to pay the mortgage or rent on time? N   In the last 12 months, was there a time when you did not have a steady place to sleep or slept in a shelter (including now)? N   Transportation Needs   In the past 12 months, has lack of transportation kept you from medical appointments or from getting medications? no   In the past 12 months, has lack of transportation kept you from meetings, work, or from getting things needed for daily living? No     Jhonatan Huffman is a 28 y.o. male presenting with abdominal pain.  No significant past medical history.  Complaining of abdominal pain starting yesterday with nausea and vomiting.  He stated that it worsened today and he presented to the emergency room where a CT of the abdomen pelvis showed pancreatitis and a right upper quadrant ultrasound showed cholelithiasis.   Patient currently lives with his grandparents, who he does help with care. Patient lives in a 2 story home, independent with care, works and drives. Patient will be discharged with no needs.

## 2024-04-24 ENCOUNTER — ANESTHESIA (OUTPATIENT)
Dept: OPERATING ROOM | Facility: HOSPITAL | Age: 29
DRG: 417 | End: 2024-04-24
Payer: COMMERCIAL

## 2024-04-24 ENCOUNTER — APPOINTMENT (OUTPATIENT)
Dept: RADIOLOGY | Facility: HOSPITAL | Age: 29
DRG: 417 | End: 2024-04-24
Payer: COMMERCIAL

## 2024-04-24 ENCOUNTER — ANESTHESIA EVENT (OUTPATIENT)
Dept: OPERATING ROOM | Facility: HOSPITAL | Age: 29
DRG: 417 | End: 2024-04-24
Payer: COMMERCIAL

## 2024-04-24 LAB
ALBUMIN SERPL-MCNC: 3.9 G/DL (ref 3.5–5)
ALP BLD-CCNC: 83 U/L (ref 35–125)
ALT SERPL-CCNC: 146 U/L (ref 5–40)
AMYLASE SERPL-CCNC: 79 U/L (ref 28–100)
ANION GAP SERPL CALC-SCNC: 8 MMOL/L
AST SERPL-CCNC: 24 U/L (ref 5–40)
BASOPHILS # BLD AUTO: 0.05 X10*3/UL (ref 0–0.1)
BASOPHILS NFR BLD AUTO: 0.3 %
BILIRUB DIRECT SERPL-MCNC: 0.2 MG/DL (ref 0–0.2)
BILIRUB SERPL-MCNC: 0.6 MG/DL (ref 0.1–1.2)
BUN SERPL-MCNC: 5 MG/DL (ref 8–25)
CALCIUM SERPL-MCNC: 8.8 MG/DL (ref 8.5–10.4)
CHLORIDE SERPL-SCNC: 100 MMOL/L (ref 97–107)
CO2 SERPL-SCNC: 28 MMOL/L (ref 24–31)
CREAT SERPL-MCNC: 0.8 MG/DL (ref 0.4–1.6)
EGFRCR SERPLBLD CKD-EPI 2021: >90 ML/MIN/1.73M*2
EOSINOPHIL # BLD AUTO: 0.49 X10*3/UL (ref 0–0.7)
EOSINOPHIL NFR BLD AUTO: 3.2 %
ERYTHROCYTE [DISTWIDTH] IN BLOOD BY AUTOMATED COUNT: 13 % (ref 11.5–14.5)
GLUCOSE SERPL-MCNC: 102 MG/DL (ref 65–99)
HCT VFR BLD AUTO: 41.5 % (ref 41–52)
HGB BLD-MCNC: 13.5 G/DL (ref 13.5–17.5)
IMM GRANULOCYTES # BLD AUTO: 0.05 X10*3/UL (ref 0–0.7)
IMM GRANULOCYTES NFR BLD AUTO: 0.3 % (ref 0–0.9)
LIPASE SERPL-CCNC: 58 U/L (ref 16–63)
LYMPHOCYTES # BLD AUTO: 2.81 X10*3/UL (ref 1.2–4.8)
LYMPHOCYTES NFR BLD AUTO: 18.3 %
MCH RBC QN AUTO: 26.3 PG (ref 26–34)
MCHC RBC AUTO-ENTMCNC: 32.5 G/DL (ref 32–36)
MCV RBC AUTO: 81 FL (ref 80–100)
MONOCYTES # BLD AUTO: 1.33 X10*3/UL (ref 0.1–1)
MONOCYTES NFR BLD AUTO: 8.7 %
NEUTROPHILS # BLD AUTO: 10.63 X10*3/UL (ref 1.2–7.7)
NEUTROPHILS NFR BLD AUTO: 69.2 %
NRBC BLD-RTO: 0 /100 WBCS (ref 0–0)
PLATELET # BLD AUTO: 239 X10*3/UL (ref 150–450)
POTASSIUM SERPL-SCNC: 4.1 MMOL/L (ref 3.4–5.1)
PROT SERPL-MCNC: 7.3 G/DL (ref 5.9–7.9)
RBC # BLD AUTO: 5.13 X10*6/UL (ref 4.5–5.9)
SODIUM SERPL-SCNC: 136 MMOL/L (ref 133–145)
WBC # BLD AUTO: 15.4 X10*3/UL (ref 4.4–11.3)

## 2024-04-24 PROCEDURE — 7100000001 HC RECOVERY ROOM TIME - INITIAL BASE CHARGE: Performed by: SURGERY

## 2024-04-24 PROCEDURE — 3600000008 HC OR TIME - EACH INCREMENTAL 1 MINUTE - PROCEDURE LEVEL THREE: Performed by: SURGERY

## 2024-04-24 PROCEDURE — 3700000001 HC GENERAL ANESTHESIA TIME - INITIAL BASE CHARGE: Performed by: SURGERY

## 2024-04-24 PROCEDURE — 2720000007 HC OR 272 NO HCPCS: Performed by: SURGERY

## 2024-04-24 PROCEDURE — 0FT44ZZ RESECTION OF GALLBLADDER, PERCUTANEOUS ENDOSCOPIC APPROACH: ICD-10-PCS | Performed by: SURGERY

## 2024-04-24 PROCEDURE — 47562 LAPAROSCOPIC CHOLECYSTECTOMY: CPT | Performed by: SURGERY

## 2024-04-24 PROCEDURE — A47562 PR LAP,CHOLECYSTECTOMY: Performed by: NURSE ANESTHETIST, CERTIFIED REGISTERED

## 2024-04-24 PROCEDURE — 2500000004 HC RX 250 GENERAL PHARMACY W/ HCPCS (ALT 636 FOR OP/ED): Performed by: SURGERY

## 2024-04-24 PROCEDURE — 82248 BILIRUBIN DIRECT: CPT | Performed by: NURSE PRACTITIONER

## 2024-04-24 PROCEDURE — 1100000001 HC PRIVATE ROOM DAILY

## 2024-04-24 PROCEDURE — 2500000004 HC RX 250 GENERAL PHARMACY W/ HCPCS (ALT 636 FOR OP/ED): Performed by: ANESTHESIOLOGY

## 2024-04-24 PROCEDURE — 83690 ASSAY OF LIPASE: CPT | Performed by: NURSE PRACTITIONER

## 2024-04-24 PROCEDURE — 80048 BASIC METABOLIC PNL TOTAL CA: CPT | Performed by: NURSE PRACTITIONER

## 2024-04-24 PROCEDURE — 7100000002 HC RECOVERY ROOM TIME - EACH INCREMENTAL 1 MINUTE: Performed by: SURGERY

## 2024-04-24 PROCEDURE — 2780000003 HC OR 278 NO HCPCS: Performed by: SURGERY

## 2024-04-24 PROCEDURE — 2500000004 HC RX 250 GENERAL PHARMACY W/ HCPCS (ALT 636 FOR OP/ED): Performed by: NURSE ANESTHETIST, CERTIFIED REGISTERED

## 2024-04-24 PROCEDURE — 2500000001 HC RX 250 WO HCPCS SELF ADMINISTERED DRUGS (ALT 637 FOR MEDICARE OP): Performed by: ANESTHESIOLOGY

## 2024-04-24 PROCEDURE — 49591 RPR AA HRN 1ST < 3 CM RDC: CPT | Performed by: SURGERY

## 2024-04-24 PROCEDURE — 85025 COMPLETE CBC W/AUTO DIFF WBC: CPT | Performed by: NURSE PRACTITIONER

## 2024-04-24 PROCEDURE — 2500000002 HC RX 250 W HCPCS SELF ADMINISTERED DRUGS (ALT 637 FOR MEDICARE OP, ALT 636 FOR OP/ED): Performed by: ANESTHESIOLOGY

## 2024-04-24 PROCEDURE — 3700000002 HC GENERAL ANESTHESIA TIME - EACH INCREMENTAL 1 MINUTE: Performed by: SURGERY

## 2024-04-24 PROCEDURE — 2500000005 HC RX 250 GENERAL PHARMACY W/O HCPCS: Performed by: INTERNAL MEDICINE

## 2024-04-24 PROCEDURE — 2500000005 HC RX 250 GENERAL PHARMACY W/O HCPCS: Performed by: NURSE ANESTHETIST, CERTIFIED REGISTERED

## 2024-04-24 PROCEDURE — 2500000002 HC RX 250 W HCPCS SELF ADMINISTERED DRUGS (ALT 637 FOR MEDICARE OP, ALT 636 FOR OP/ED): Mod: MUE | Performed by: ANESTHESIOLOGY

## 2024-04-24 PROCEDURE — 2500000004 HC RX 250 GENERAL PHARMACY W/ HCPCS (ALT 636 FOR OP/ED): Mod: JZ | Performed by: NURSE PRACTITIONER

## 2024-04-24 PROCEDURE — 2500000004 HC RX 250 GENERAL PHARMACY W/ HCPCS (ALT 636 FOR OP/ED): Performed by: NURSE PRACTITIONER

## 2024-04-24 PROCEDURE — 94762 N-INVAS EAR/PLS OXIMTRY CONT: CPT

## 2024-04-24 PROCEDURE — A47562 PR LAP,CHOLECYSTECTOMY: Performed by: ANESTHESIOLOGY

## 2024-04-24 PROCEDURE — 0WQF0ZZ REPAIR ABDOMINAL WALL, OPEN APPROACH: ICD-10-PCS | Performed by: SURGERY

## 2024-04-24 PROCEDURE — 88304 TISSUE EXAM BY PATHOLOGIST: CPT | Performed by: PATHOLOGY

## 2024-04-24 PROCEDURE — 9420000001 HC RT PATIENT EDUCATION 5 MIN

## 2024-04-24 PROCEDURE — 36415 COLL VENOUS BLD VENIPUNCTURE: CPT | Performed by: NURSE PRACTITIONER

## 2024-04-24 PROCEDURE — A4550 SURGICAL TRAYS: HCPCS | Performed by: SURGERY

## 2024-04-24 PROCEDURE — 88304 TISSUE EXAM BY PATHOLOGIST: CPT | Mod: TC | Performed by: SURGERY

## 2024-04-24 PROCEDURE — 2500000001 HC RX 250 WO HCPCS SELF ADMINISTERED DRUGS (ALT 637 FOR MEDICARE OP): Performed by: SURGERY

## 2024-04-24 PROCEDURE — 3600000003 HC OR TIME - INITIAL BASE CHARGE - PROCEDURE LEVEL THREE: Performed by: SURGERY

## 2024-04-24 PROCEDURE — 82150 ASSAY OF AMYLASE: CPT | Performed by: NURSE PRACTITIONER

## 2024-04-24 PROCEDURE — 2500000004 HC RX 250 GENERAL PHARMACY W/ HCPCS (ALT 636 FOR OP/ED): Performed by: INTERNAL MEDICINE

## 2024-04-24 PROCEDURE — 2500000005 HC RX 250 GENERAL PHARMACY W/O HCPCS: Performed by: SURGERY

## 2024-04-24 RX ORDER — FONDAPARINUX SODIUM 2.5 MG/.5ML
2.5 INJECTION SUBCUTANEOUS EVERY 24 HOURS
Status: DISCONTINUED | OUTPATIENT
Start: 2024-04-24 | End: 2024-04-26 | Stop reason: HOSPADM

## 2024-04-24 RX ORDER — LIDOCAINE HYDROCHLORIDE AND EPINEPHRINE 10; 10 MG/ML; UG/ML
INJECTION, SOLUTION INFILTRATION; PERINEURAL AS NEEDED
Status: DISCONTINUED | OUTPATIENT
Start: 2024-04-24 | End: 2024-04-24 | Stop reason: HOSPADM

## 2024-04-24 RX ORDER — MEPERIDINE HYDROCHLORIDE 25 MG/ML
12.5 INJECTION INTRAMUSCULAR; INTRAVENOUS; SUBCUTANEOUS EVERY 10 MIN PRN
Status: DISCONTINUED | OUTPATIENT
Start: 2024-04-24 | End: 2024-04-24 | Stop reason: HOSPADM

## 2024-04-24 RX ORDER — ONDANSETRON HYDROCHLORIDE 2 MG/ML
4 INJECTION, SOLUTION INTRAVENOUS ONCE AS NEEDED
Status: DISCONTINUED | OUTPATIENT
Start: 2024-04-24 | End: 2024-04-24 | Stop reason: HOSPADM

## 2024-04-24 RX ORDER — MIDAZOLAM HYDROCHLORIDE 1 MG/ML
INJECTION, SOLUTION INTRAMUSCULAR; INTRAVENOUS AS NEEDED
Status: DISCONTINUED | OUTPATIENT
Start: 2024-04-24 | End: 2024-04-24

## 2024-04-24 RX ORDER — ROCURONIUM BROMIDE 10 MG/ML
INJECTION, SOLUTION INTRAVENOUS AS NEEDED
Status: DISCONTINUED | OUTPATIENT
Start: 2024-04-24 | End: 2024-04-24

## 2024-04-24 RX ORDER — FAMOTIDINE 20 MG/1
20 TABLET, FILM COATED ORAL ONCE
Status: COMPLETED | OUTPATIENT
Start: 2024-04-24 | End: 2024-04-24

## 2024-04-24 RX ORDER — BUPIVACAINE HYDROCHLORIDE 5 MG/ML
INJECTION, SOLUTION PERINEURAL AS NEEDED
Status: DISCONTINUED | OUTPATIENT
Start: 2024-04-24 | End: 2024-04-24 | Stop reason: HOSPADM

## 2024-04-24 RX ORDER — HYDROMORPHONE HYDROCHLORIDE 1 MG/ML
1 INJECTION, SOLUTION INTRAMUSCULAR; INTRAVENOUS; SUBCUTANEOUS EVERY 2 HOUR PRN
Status: DISCONTINUED | OUTPATIENT
Start: 2024-04-24 | End: 2024-04-26 | Stop reason: HOSPADM

## 2024-04-24 RX ORDER — DIPHENHYDRAMINE HYDROCHLORIDE 50 MG/ML
12.5 INJECTION INTRAMUSCULAR; INTRAVENOUS ONCE AS NEEDED
Status: DISCONTINUED | OUTPATIENT
Start: 2024-04-24 | End: 2024-04-24 | Stop reason: HOSPADM

## 2024-04-24 RX ORDER — OXYCODONE HCL 10 MG/1
10 TABLET, FILM COATED, EXTENDED RELEASE ORAL ONCE AS NEEDED
Status: COMPLETED | OUTPATIENT
Start: 2024-04-24 | End: 2024-04-25

## 2024-04-24 RX ORDER — ALBUTEROL SULFATE 0.83 MG/ML
2.5 SOLUTION RESPIRATORY (INHALATION) ONCE AS NEEDED
Status: COMPLETED | OUTPATIENT
Start: 2024-04-24 | End: 2024-04-24

## 2024-04-24 RX ORDER — SODIUM CHLORIDE 450 MG/100ML
50 INJECTION, SOLUTION INTRAVENOUS CONTINUOUS
Status: DISCONTINUED | OUTPATIENT
Start: 2024-04-24 | End: 2024-04-26 | Stop reason: HOSPADM

## 2024-04-24 RX ORDER — METOCLOPRAMIDE 10 MG/1
10 TABLET ORAL ONCE
Status: COMPLETED | OUTPATIENT
Start: 2024-04-24 | End: 2024-04-24

## 2024-04-24 RX ORDER — SUCCINYLCHOLINE CHLORIDE 20 MG/ML
INJECTION INTRAMUSCULAR; INTRAVENOUS AS NEEDED
Status: DISCONTINUED | OUTPATIENT
Start: 2024-04-24 | End: 2024-04-24

## 2024-04-24 RX ORDER — OXYCODONE AND ACETAMINOPHEN 5; 325 MG/1; MG/1
1 TABLET ORAL EVERY 4 HOURS PRN
Status: DISCONTINUED | OUTPATIENT
Start: 2024-04-24 | End: 2024-04-26 | Stop reason: HOSPADM

## 2024-04-24 RX ORDER — FENTANYL CITRATE 50 UG/ML
INJECTION, SOLUTION INTRAMUSCULAR; INTRAVENOUS AS NEEDED
Status: DISCONTINUED | OUTPATIENT
Start: 2024-04-24 | End: 2024-04-24

## 2024-04-24 RX ORDER — PROPOFOL 10 MG/ML
INJECTION, EMULSION INTRAVENOUS AS NEEDED
Status: DISCONTINUED | OUTPATIENT
Start: 2024-04-24 | End: 2024-04-24

## 2024-04-24 RX ORDER — OXYCODONE AND ACETAMINOPHEN 5; 325 MG/1; MG/1
2 TABLET ORAL EVERY 4 HOURS PRN
Status: DISCONTINUED | OUTPATIENT
Start: 2024-04-24 | End: 2024-04-26 | Stop reason: HOSPADM

## 2024-04-24 RX ORDER — HYDRALAZINE HYDROCHLORIDE 20 MG/ML
10 INJECTION INTRAMUSCULAR; INTRAVENOUS EVERY 30 MIN PRN
Status: DISCONTINUED | OUTPATIENT
Start: 2024-04-24 | End: 2024-04-24 | Stop reason: HOSPADM

## 2024-04-24 RX ORDER — LABETALOL HYDROCHLORIDE 5 MG/ML
10 INJECTION, SOLUTION INTRAVENOUS ONCE AS NEEDED
Status: DISCONTINUED | OUTPATIENT
Start: 2024-04-24 | End: 2024-04-24 | Stop reason: HOSPADM

## 2024-04-24 RX ORDER — ALBUTEROL SULFATE 0.83 MG/ML
2.5 SOLUTION RESPIRATORY (INHALATION) EVERY 6 HOURS PRN
Status: DISCONTINUED | OUTPATIENT
Start: 2024-04-24 | End: 2024-04-24 | Stop reason: HOSPADM

## 2024-04-24 RX ORDER — KETOROLAC TROMETHAMINE 30 MG/ML
15 INJECTION, SOLUTION INTRAMUSCULAR; INTRAVENOUS ONCE AS NEEDED
Status: DISCONTINUED | OUTPATIENT
Start: 2024-04-24 | End: 2024-04-24 | Stop reason: HOSPADM

## 2024-04-24 RX ORDER — LIDOCAINE HYDROCHLORIDE 10 MG/ML
INJECTION INFILTRATION; PERINEURAL AS NEEDED
Status: DISCONTINUED | OUTPATIENT
Start: 2024-04-24 | End: 2024-04-24

## 2024-04-24 RX ORDER — SODIUM CHLORIDE, SODIUM LACTATE, POTASSIUM CHLORIDE, CALCIUM CHLORIDE 600; 310; 30; 20 MG/100ML; MG/100ML; MG/100ML; MG/100ML
INJECTION, SOLUTION INTRAVENOUS CONTINUOUS PRN
Status: DISCONTINUED | OUTPATIENT
Start: 2024-04-24 | End: 2024-04-24

## 2024-04-24 RX ORDER — ALBUTEROL SULFATE 0.83 MG/ML
2.5 SOLUTION RESPIRATORY (INHALATION) ONCE
Status: COMPLETED | OUTPATIENT
Start: 2024-04-24 | End: 2024-04-24

## 2024-04-24 RX ORDER — KETAMINE HYDROCHLORIDE 50 MG/ML
INJECTION, SOLUTION INTRAMUSCULAR; INTRAVENOUS AS NEEDED
Status: DISCONTINUED | OUTPATIENT
Start: 2024-04-24 | End: 2024-04-24

## 2024-04-24 RX ADMIN — HYDROMORPHONE HYDROCHLORIDE 1 MG: 1 INJECTION, SOLUTION INTRAMUSCULAR; INTRAVENOUS; SUBCUTANEOUS at 00:32

## 2024-04-24 RX ADMIN — ROCURONIUM BROMIDE 40 MG: 10 INJECTION, SOLUTION INTRAVENOUS at 09:06

## 2024-04-24 RX ADMIN — ROCURONIUM BROMIDE 10 MG: 10 INJECTION, SOLUTION INTRAVENOUS at 09:02

## 2024-04-24 RX ADMIN — PROPOFOL 150 MG: 10 INJECTION, EMULSION INTRAVENOUS at 09:02

## 2024-04-24 RX ADMIN — CEFTRIAXONE SODIUM 1 G: 1 INJECTION, SOLUTION INTRAVENOUS at 15:50

## 2024-04-24 RX ADMIN — Medication 2 L/MIN: at 23:00

## 2024-04-24 RX ADMIN — LIDOCAINE HYDROCHLORIDE 5 ML: 10 INJECTION, SOLUTION INFILTRATION; PERINEURAL at 09:02

## 2024-04-24 RX ADMIN — SODIUM CHLORIDE, POTASSIUM CHLORIDE, SODIUM LACTATE AND CALCIUM CHLORIDE: 600; 310; 30; 20 INJECTION, SOLUTION INTRAVENOUS at 08:57

## 2024-04-24 RX ADMIN — ONDANSETRON HYDROCHLORIDE 4 MG: 2 INJECTION INTRAMUSCULAR; INTRAVENOUS at 09:05

## 2024-04-24 RX ADMIN — SUGAMMADEX 200 MG: 100 INJECTION, SOLUTION INTRAVENOUS at 11:14

## 2024-04-24 RX ADMIN — ONDANSETRON HYDROCHLORIDE 4 MG: 2 INJECTION INTRAMUSCULAR; INTRAVENOUS at 20:35

## 2024-04-24 RX ADMIN — FLUOXETINE HYDROCHLORIDE 20 MG: 20 CAPSULE ORAL at 20:35

## 2024-04-24 RX ADMIN — FENTANYL CITRATE 50 MCG: 0.05 INJECTION, SOLUTION INTRAMUSCULAR; INTRAVENOUS at 10:22

## 2024-04-24 RX ADMIN — Medication 2 L/MIN: at 19:06

## 2024-04-24 RX ADMIN — HYDROMORPHONE HYDROCHLORIDE 1 MG: 1 INJECTION, SOLUTION INTRAMUSCULAR; INTRAVENOUS; SUBCUTANEOUS at 06:34

## 2024-04-24 RX ADMIN — KETAMINE HYDROCHLORIDE 10 MG: 50 INJECTION, SOLUTION INTRAMUSCULAR; INTRAVENOUS at 09:39

## 2024-04-24 RX ADMIN — ONDANSETRON HYDROCHLORIDE 4 MG: 2 INJECTION INTRAMUSCULAR; INTRAVENOUS at 15:50

## 2024-04-24 RX ADMIN — CEFAZOLIN SODIUM 2 G: 2 INJECTION, SOLUTION INTRAVENOUS at 08:57

## 2024-04-24 RX ADMIN — OXYCODONE HYDROCHLORIDE AND ACETAMINOPHEN 1 TABLET: 5; 325 TABLET ORAL at 20:34

## 2024-04-24 RX ADMIN — SUCCINYLCHOLINE CHLORIDE 140 MG: 20 INJECTION, SOLUTION INTRAMUSCULAR; INTRAVENOUS at 09:02

## 2024-04-24 RX ADMIN — ROCURONIUM BROMIDE 10 MG: 10 INJECTION, SOLUTION INTRAVENOUS at 10:17

## 2024-04-24 RX ADMIN — FENTANYL CITRATE 50 MCG: 0.05 INJECTION, SOLUTION INTRAMUSCULAR; INTRAVENOUS at 09:30

## 2024-04-24 RX ADMIN — KETAMINE HYDROCHLORIDE 15 MG: 50 INJECTION, SOLUTION INTRAMUSCULAR; INTRAVENOUS at 10:17

## 2024-04-24 RX ADMIN — HYDROMORPHONE HYDROCHLORIDE 0.2 MG: 0.2 INJECTION, SOLUTION INTRAMUSCULAR; INTRAVENOUS; SUBCUTANEOUS at 12:20

## 2024-04-24 RX ADMIN — KETAMINE HYDROCHLORIDE 10 MG: 50 INJECTION, SOLUTION INTRAMUSCULAR; INTRAVENOUS at 10:00

## 2024-04-24 RX ADMIN — DEXTROSE MONOHYDRATE, SODIUM CHLORIDE, SODIUM LACTATE, POTASSIUM CHLORIDE, CALCIUM CHLORIDE 125 ML/HR: 5; 600; 310; 179; 20 INJECTION, SOLUTION INTRAVENOUS at 03:31

## 2024-04-24 RX ADMIN — KETAMINE HYDROCHLORIDE 15 MG: 50 INJECTION, SOLUTION INTRAMUSCULAR; INTRAVENOUS at 09:34

## 2024-04-24 RX ADMIN — METOCLOPRAMIDE 10 MG: 10 TABLET ORAL at 08:43

## 2024-04-24 RX ADMIN — ONDANSETRON HYDROCHLORIDE 4 MG: 2 INJECTION INTRAMUSCULAR; INTRAVENOUS at 23:37

## 2024-04-24 RX ADMIN — ALBUTEROL SULFATE 2.5 MG: 2.5 SOLUTION RESPIRATORY (INHALATION) at 08:43

## 2024-04-24 RX ADMIN — FENTANYL CITRATE 50 MCG: 0.05 INJECTION, SOLUTION INTRAMUSCULAR; INTRAVENOUS at 09:02

## 2024-04-24 RX ADMIN — ONDANSETRON HYDROCHLORIDE 4 MG: 2 INJECTION INTRAMUSCULAR; INTRAVENOUS at 06:35

## 2024-04-24 RX ADMIN — FONDAPARINUX SODIUM 2.5 MG: 2.5 INJECTION SUBCUTANEOUS at 15:42

## 2024-04-24 RX ADMIN — FENTANYL CITRATE 50 MCG: 0.05 INJECTION, SOLUTION INTRAMUSCULAR; INTRAVENOUS at 09:12

## 2024-04-24 RX ADMIN — ONDANSETRON HYDROCHLORIDE 4 MG: 2 INJECTION INTRAMUSCULAR; INTRAVENOUS at 02:51

## 2024-04-24 RX ADMIN — ALBUTEROL SULFATE 2.5 MG: 2.5 SOLUTION RESPIRATORY (INHALATION) at 12:06

## 2024-04-24 RX ADMIN — FENTANYL CITRATE 50 MCG: 0.05 INJECTION, SOLUTION INTRAMUSCULAR; INTRAVENOUS at 09:21

## 2024-04-24 RX ADMIN — MIDAZOLAM HYDROCHLORIDE 2 MG: 1 INJECTION, SOLUTION INTRAMUSCULAR; INTRAVENOUS at 08:58

## 2024-04-24 RX ADMIN — HYDROMORPHONE HYDROCHLORIDE 0.2 MG: 0.2 INJECTION, SOLUTION INTRAMUSCULAR; INTRAVENOUS; SUBCUTANEOUS at 12:10

## 2024-04-24 RX ADMIN — FAMOTIDINE 20 MG: 20 TABLET ORAL at 08:43

## 2024-04-24 RX ADMIN — HYDROMORPHONE HYDROCHLORIDE 1 MG: 1 INJECTION, SOLUTION INTRAMUSCULAR; INTRAVENOUS; SUBCUTANEOUS at 03:29

## 2024-04-24 RX ADMIN — DEXAMETHASONE SODIUM PHOSPHATE 8 MG: 4 INJECTION, SOLUTION INTRAMUSCULAR; INTRAVENOUS at 09:05

## 2024-04-24 RX ADMIN — FENTANYL CITRATE 50 MCG: 0.05 INJECTION, SOLUTION INTRAMUSCULAR; INTRAVENOUS at 10:17

## 2024-04-24 RX ADMIN — OXYCODONE HYDROCHLORIDE AND ACETAMINOPHEN 2 TABLET: 5; 325 TABLET ORAL at 15:47

## 2024-04-24 RX ADMIN — SODIUM CHLORIDE 50 ML/HR: 450 INJECTION, SOLUTION INTRAVENOUS at 13:49

## 2024-04-24 SDOH — HEALTH STABILITY: MENTAL HEALTH: CURRENT SMOKER: 0

## 2024-04-24 ASSESSMENT — PAIN DESCRIPTION - LOCATION
LOCATION: ABDOMEN

## 2024-04-24 ASSESSMENT — PAIN - FUNCTIONAL ASSESSMENT

## 2024-04-24 ASSESSMENT — PAIN SCALES - GENERAL
PAINLEVEL_OUTOF10: 0 - NO PAIN
PAINLEVEL_OUTOF10: 7
PAINLEVEL_OUTOF10: 3
PAINLEVEL_OUTOF10: 5 - MODERATE PAIN
PAINLEVEL_OUTOF10: 6
PAIN_LEVEL: 2
PAINLEVEL_OUTOF10: 5 - MODERATE PAIN
PAINLEVEL_OUTOF10: 7
PAINLEVEL_OUTOF10: 8
PAINLEVEL_OUTOF10: 4
PAINLEVEL_OUTOF10: 6
PAINLEVEL_OUTOF10: 1
PAINLEVEL_OUTOF10: 6
PAINLEVEL_OUTOF10: 5 - MODERATE PAIN
PAINLEVEL_OUTOF10: 7
PAINLEVEL_OUTOF10: 3

## 2024-04-24 ASSESSMENT — COGNITIVE AND FUNCTIONAL STATUS - GENERAL
DAILY ACTIVITIY SCORE: 24
DAILY ACTIVITIY SCORE: 24
MOBILITY SCORE: 24
MOBILITY SCORE: 24

## 2024-04-24 ASSESSMENT — PAIN DESCRIPTION - DESCRIPTORS
DESCRIPTORS: ACHING
DESCRIPTORS: ACHING

## 2024-04-24 ASSESSMENT — PAIN DESCRIPTION - ORIENTATION: ORIENTATION: RIGHT

## 2024-04-24 NOTE — NURSING NOTE
Assumed care of patient.  Patient resting in bed, sleeping soundly.  BSSR completed.  Bed low and call light within reach.

## 2024-04-24 NOTE — CARE PLAN
The patient's goals for the shift include      The clinical goals for the shift include pt safety and pain control    Over the shift, the patient did not make progress toward the following goals. Barriers to progression include s/p lap choley. Recommendations to address these barriers include encourage patient to walk and sit in chair..

## 2024-04-24 NOTE — ANESTHESIA PROCEDURE NOTES
Airway  Date/Time: 4/24/2024 9:03 AM  Urgency: elective    Airway not difficult    Staffing  Performed: CRNA   Authorized by: Jaime Myers DO    Performed by: AMIRA Eldridge-JAMIE  Patient location during procedure: OR    Indications and Patient Condition  Indications for airway management: anesthesia and airway protection  Spontaneous Ventilation: absent  Sedation level: deep  Preoxygenated: yes  Patient position: sniffing  MILS maintained throughout  Mask difficulty assessment: 0 - not attempted  Planned trial extubation    Final Airway Details  Final airway type: endotracheal airway      Successful airway: ETT  Cuffed: yes   Successful intubation technique: video laryngoscopy  Facilitating devices/methods: intubating stylet  Endotracheal tube insertion site: oral  Blade: Mojgan  Blade size: #3  ETT size (mm): 7.5  Cormack-Lehane Classification: grade I - full view of glottis  Placement verified by: chest auscultation, capnometry and palpation of cuff   Cuff volume (mL): 10  Measured from: teeth  ETT to teeth (cm): 22  Number of attempts at approach: 1  Ventilation between attempts: none  Number of other approaches attempted: 0    Additional Comments  No change to oral cavity/ dentition.

## 2024-04-24 NOTE — NURSING NOTE
Received BSSR and assumed care of pt.  Pt sitting up in bed with call light within reach.  Has no requests at this time.  Call light within reach.

## 2024-04-24 NOTE — PROGRESS NOTES
04/24/24 1729   Discharge Planning   Living Arrangements Family members   Support Systems Family members;Parent   Assistance Needed Independent   Type of Residence Private residence   Home or Post Acute Services None   Patient expects to be discharged to: Home with no needs   Does the patient need discharge transport arranged? No     Patient returned from surgery. Potential discharge tomorrow with no needs.

## 2024-04-24 NOTE — NURSING NOTE
Patient arrived back on unit from surgery.  Patient has a VIN drain to RLQ, dressing is intact with a small amount of drainage .

## 2024-04-24 NOTE — PROGRESS NOTES
"Jhonatan Huffman is a 28 y.o. male on day 3 of admission presenting with Gallstone pancreatitis ([facility]).    Subjective   Seen and examined patient awaiting surgery at approximately 11 AM this morning resting comfortably       Objective     Physical Exam  Vitals and nursing note reviewed.   Constitutional:       Appearance: Normal appearance. He is obese.   HENT:      Head: Normocephalic and atraumatic.      Mouth/Throat:      Mouth: Mucous membranes are dry.   Eyes:      Extraocular Movements: Extraocular movements intact.   Cardiovascular:      Rate and Rhythm: Normal rate and regular rhythm.      Pulses: Normal pulses.      Heart sounds: Normal heart sounds.   Pulmonary:      Effort: Pulmonary effort is normal.      Breath sounds: Normal breath sounds.   Abdominal:      Palpations: Abdomen is soft.   Musculoskeletal:         General: Normal range of motion.   Skin:     General: Skin is warm and dry.      Capillary Refill: Capillary refill takes less than 2 seconds.   Neurological:      General: No focal deficit present.      Mental Status: He is alert.   Psychiatric:         Mood and Affect: Mood normal.         Last Recorded Vitals  Blood pressure 112/57, pulse 97, temperature 37.1 °C (98.7 °F), temperature source Oral, resp. rate 17, height 1.702 m (5' 7.01\"), weight 117 kg (257 lb 0.9 oz), SpO2 96%.  Intake/Output last 3 Shifts:  I/O last 3 completed shifts:  In: 1033.3 (8.9 mL/kg) [IV Piggyback:1033.3]  Out: - (0 mL/kg)   Weight: 116.6 kg     Relevant Results              Results for orders placed or performed during the hospital encounter of 04/21/24 (from the past 24 hour(s))   Amylase   Result Value Ref Range    Amylase 79 28 - 100 U/L   Lipase   Result Value Ref Range    Lipase 58 16 - 63 U/L   Hepatic function panel   Result Value Ref Range    AST 24 5 - 40 U/L     (H) 5 - 40 U/L    Alkaline Phosphatase 83 35 - 125 U/L    Bilirubin, Total 0.6 0.1 - 1.2 mg/dL    Bilirubin, Direct 0.2 0.0 - 0.2 " mg/dL    Total Protein 7.3 5.9 - 7.9 g/dL    Albumin 3.9 3.5 - 5.0 g/dL   Basic Metabolic Panel   Result Value Ref Range    Glucose 102 (H) 65 - 99 mg/dL    Sodium 136 133 - 145 mmol/L    Potassium 4.1 3.4 - 5.1 mmol/L    Chloride 100 97 - 107 mmol/L    Bicarbonate 28 24 - 31 mmol/L    Urea Nitrogen 5 (L) 8 - 25 mg/dL    Creatinine 0.80 0.40 - 1.60 mg/dL    eGFR >90 >60 mL/min/1.73m*2    Calcium 8.8 8.5 - 10.4 mg/dL    Anion Gap 8 <=19 mmol/L   CBC and Auto Differential   Result Value Ref Range    WBC 15.4 (H) 4.4 - 11.3 x10*3/uL    nRBC 0.0 0.0 - 0.0 /100 WBCs    RBC 5.13 4.50 - 5.90 x10*6/uL    Hemoglobin 13.5 13.5 - 17.5 g/dL    Hematocrit 41.5 41.0 - 52.0 %    MCV 81 80 - 100 fL    MCH 26.3 26.0 - 34.0 pg    MCHC 32.5 32.0 - 36.0 g/dL    RDW 13.0 11.5 - 14.5 %    Platelets 239 150 - 450 x10*3/uL    Neutrophils % 69.2 40.0 - 80.0 %    Immature Granulocytes %, Automated 0.3 0.0 - 0.9 %    Lymphocytes % 18.3 13.0 - 44.0 %    Monocytes % 8.7 2.0 - 10.0 %    Eosinophils % 3.2 0.0 - 6.0 %    Basophils % 0.3 0.0 - 2.0 %    Neutrophils Absolute 10.63 (H) 1.20 - 7.70 x10*3/uL    Immature Granulocytes Absolute, Automated 0.05 0.00 - 0.70 x10*3/uL    Lymphocytes Absolute 2.81 1.20 - 4.80 x10*3/uL    Monocytes Absolute 1.33 (H) 0.10 - 1.00 x10*3/uL    Eosinophils Absolute 0.49 0.00 - 0.70 x10*3/uL    Basophils Absolute 0.05 0.00 - 0.10 x10*3/uL    Scheduled medications  ceFAZolin, 2 g, intravenous, Once  cefTRIAXone, 1 g, intravenous, q24h  famotidine, 20 mg, intravenous, q12h VEDA  FLUoxetine, 20 mg, oral, Nightly  nicotine, 1 patch, transdermal, Daily  ondansetron, 4 mg, intravenous, q4h      Continuous medications   dextrose 5% lactated Ringer's with KCl 20 mEq/L, 125 mL/hr, Last Rate: 125 mL/hr (04/24/24 0331)      PRN medications  PRN medications: HYDROmorphone, HYDROmorphone, ketorolac                   Assessment/Plan   Principal Problem:    Gallstone pancreatitis ([facility]-HCC)    Awaiting surgery at 11:00 this morning  anticipating discharge later today or first thing tomorrow       I spent 20 minutes in the professional and overall care of this patient.      Wilmer Hillman, DO

## 2024-04-24 NOTE — OP NOTE
Cholecystectomy Laparoscopy Operative Note     Date: 2024 - 2024  OR Location: [facility]    Name: Jhonatan Huffman : 1995, Age: 28 y.o., MRN: [id number], Sex: male    Diagnosis  Pre-op Diagnosis     * Gallstone pancreatitis ([facility]) [K85.10] Post-op Diagnosis     * Gallstone pancreatitis ([facility]) [K85.10]  Acute cholecystititis     Procedures  Cholecystectomy Laparoscopy  20712 - NE LAPS SURG CHOLECYSTECTOMY W/CHOLANGIOGRAPHY  Primary repiar of umbilical hernia  Morbid obesity requiring extra trocar site for retraction    Surgeons      * Jessica Wheeler - Primary    Resident/Fellow/Other Assistant:  Surgeons and Role:  * No surgeons found with a matching role *    Procedure Summary  Anesthesia: General  ASA: III  Anesthesia Staff: Anesthesiologist: Jaime Myers DO  CRNA: AMIRA Eldridge-CRNA  Estimated Blood Loss: 50mL  Intra-op Medications: Administrations occurring from 1115 to 1300 on 24:  * No intraprocedure medications in log *      Intraprocedure I/O Totals       None           Specimen:   ID Type Source Tests Collected by Time   1 : gallbladder Tissue GALLBLADDER CHOLECYSTECTOMY SURGICAL PATHOLOGY EXAM Jessica Wheeler MD 2024 1007        Staff:   Circulator: Jon Montes De Oca RN  Scrub Person: NITHIN Agudelo         Drains and/or Catheters:   Closed/Suction Drain Right RLQ 7 Fr. (Active)       [REMOVED] NG/OG/Feeding Tube OG - [place] sump 18 Fr Center mouth (Removed)       Tourniquet Times:         Implants:     Findings: Patient was significantly inflamed gallbladder consistent with acute cholecystitis. Anatomy was abberant as the cystic duct was coming off lateral and posterior to the gallbladder fundus.  Due to significant inflammation unable to obtain intraoperative cholangiograms.  Due to significant inflammation small part of the fundus back wall was left on the liver.  7 Indonesian drain left in place.  Patient also with small umbilical hernia repaired  primarily    Indications: Jhonatan Huffman is an 28 y.o. male who is having surgery for Gallstone pancreatitis ([facility]) [K85.10].     The patient was seen in the preoperative area. The risks, benefits, complications, treatment options, non-operative alternatives, expected recovery and outcomes were discussed with the patient. The possibilities of reaction to medication, pulmonary aspiration, injury to surrounding structures, bleeding, recurrent infection, the need for additional procedures, failure to diagnose a condition, and creating a complication requiring transfusion or operation were discussed with the patient. The patient concurred with the proposed plan, giving informed consent.  The site of surgery was properly noted/marked if necessary per policy. The patient has been actively warmed in preoperative area. Preoperative antibiotics have been ordered and given within 1 hours of incision. Venous thrombosis prophylaxis have been ordered including bilateral sequential compression devices    Procedure Details: Lap arabella:  Patient was brought in the room laid in the supine position.  General anesthesia was obtained with ET intubation.  The abdomen was prepped and draped in a sterile fashion.  Marking pen was used to delineate the line of incision above the umbilicus.  Local was infiltrated into the tissues.  This was 1% lidocaine mixed with 25% Marcaine with epi.  A 15 blade scalpel used to make a 2 centimeter semicircular incision in the skin.  Underlying subcutaneous tissue  electrocautery down to the hernia sac. The hernia sac was dissected off the dermois of the umbiliclus. Fascia edges of hernia was grasped between 2 Kocher clamps brought up through the incision and Janel clamp was used to enter the peritoneum two 0-Vicryl stay sutures were placed on the fascia a 10/ 12 son trocar is placed into the abdomen the abdomen was insufflated up to 15 millimeters of mercury.  Patient was then placed in  the reverse Trendelenburg left-side down position.  Three  5 millimeter trocars were placed subcostally, 1 in the anterior axial line, 1 at the midclavicular line and 1 in the epigastrium.  This was done by 1st locally anesthetizing the skin followed by separation of the skin 1 centimeter with 11 blade.  All trocars were placed under direct vision.  An additional trocar had to be placed to the right of the umbilicus and slightly above the umbilicus in order to facilitate retraction of the significant mental mental tissue.  Patient had a significant amount of omentum stuck to the gallbladder and liver.  This was taken down use electrocautery hook.  This then enable us to grab the gallbladder at the fundus.  Gallbladder grasped at the fundus and reflected upward.  Adhesions were taken down from the gallbladder body.  This facilitated grasping Agueda's pouch.  Dissection continued from lateral to medial.  Structures seen posterior and lateral was felt to be the posterior cystic artery.  This was clipped twice proximally once distally as it was clearly going to the gallbladder and it was transected.  Upon transection it looks like perhaps this was indeed the cystic duct.  There was significant amount inflammation in the triangle of Dilia.  Further dissection lateral to medial revealed another tubular structure going towards the gallbladder.  This was on top of what we felt to be the common duct.  This was isolated.  2 clips placed proximally 1 distally was transected this was indeed the anterior artery.  This then exposed the rest of the anatomy showing that the initial structure located lateral and posterior was actually the cystic duct coming off posterior and laterally from the common bile duct.  Common bile duct was dissected and completely intact.  Intraoperative cholangiograms could not be performed however.  Posterior artery was then encountered.  This was clipped 3 times proximally once distally and  transected.  The gallbladder was taken off the liver bed using electrocautery hook.  There was somewhat significant inflammation that the back wall of the gallbladder high up in the fundal area was left in place.  Gallbladder was amputated high up at the fundus.  Camera was switched to a 5 mm camera. The endobag was brought to the umbilicus the gallbladder is placed in the endobag.  This was done by switching the camera to the 5 millimeter port site.  Camera was placed back into the 10 /12 Chante trocar.  Clips were re-examined and felt to be intact.  We thoroughly cauterized the gallbladder back wall that was left in place attached to the liver.  There was no evidence of bile leaking anywhere.  Clips were reexamined and felt to be intact.  Cautery used in the liver bed.  We had good hemostasis.  We did place some Surgi-Jason powder into the area.  We also left a 7 Iranian drain into the gallbladder fossa and brought out through the most lateral trocar site.  It was sewn in with 3-0 nylon.  Patient was then placed in the neutral position.  All trocars removed under direct vision.    Operation continue with primary repair of the umbilical goal hernia.  This was closed using 2-0 Vicryl figure-of-eight sutures.  The umbilicus had been tacked back down to the fascia.  All incisions closed with interrupted 3-0 and 4-0 Vicryl for the 1 cm incisions.  4-0 Monocryl used at the umbilicus.  Prior to closure the CO2 was evacuated  from the abdominal cavity.    Steri-Strips and sterile dressings were applied. Patient tolerated  procedure well, extubated in the operating room transferred to recovery room with rails up. All counts were good .   Complications:  None; patient tolerated the procedure well.    Disposition: PACU - hemodynamically stable.  Condition: stable         Additional Details:     Attending Attestation: I was present and scrubbed for the entire procedure.    Jessica Wheeler  Phone Number: [phone]

## 2024-04-24 NOTE — ANESTHESIA PREPROCEDURE EVALUATION
Patient: Jhonatan Huffman    Procedure Information       Date/Time: 04/24/24 1115    Procedure: Cholecystectomy Laparoscopy with Cholangiogram - C-arm    [facility] / [facility]    Surgeons: Jessica Wheeler MD            Relevant Problems   Pulmonary   (+) Complex sleep apnea syndrome      Neuro   (+) Paranoid schizophrenia (Multi)      Liver   (+) Gallstone pancreatitis ([facility]-HCC)       Clinical information reviewed:   Tobacco  Allergies  Meds  Problems  Med Hx  Surg Hx   Fam Hx  Soc   Hx        NPO Detail:  No data recorded     Physical Exam    Airway  Mallampati: III  TM distance: >3 FB  Neck ROM: full  Comments: Smaller mouth, beard   Cardiovascular   Rhythm: regular  Rate: normal     Dental - normal exam       Pulmonary   Breath sounds clear to auscultation     Abdominal   Abdomen: soft             Anesthesia Plan    History of general anesthesia?: yes  History of complications of general anesthesia?: no    ASA 3     general     The patient is not a current smoker.    intravenous induction   Postoperative administration of opioids is intended.  Anesthetic plan and risks discussed with patient.    Plan discussed with CRNA, attending and CAA.

## 2024-04-24 NOTE — ANESTHESIA POSTPROCEDURE EVALUATION
Patient: Jhonatan Huffman    Procedure Summary       Date: 04/24/24 Room / [facility]    Anesthesia Start: 0857 Anesthesia Stop: 1140    Procedure: Cholecystectomy Laparoscopy Diagnosis:       Gallstone pancreatitis ([facility]-HCC)      (Gallstone pancreatitis ([facility]-HCC) [K85.10])    Surgeons: Jessica Wheeler MD Responsible Provider: Jaime Myers DO    Anesthesia Type: general ASA Status: 3            Anesthesia Type: general    Vitals Value Taken Time   /76 04/24/24 1256   Temp 36.3 °C (97.3 °F) 04/24/24 1140   Pulse 89 04/24/24 1254   Resp 15 04/24/24 1254   SpO2 98 % 04/24/24 1258   Vitals shown include unfiled device data.    Anesthesia Post Evaluation    Patient location during evaluation: bedside  Patient participation: complete - patient participated  Level of consciousness: awake  Pain score: 2  Pain management: adequate  Airway patency: patent  Two or more strategies used to mitigate risk of obstructive sleep apnea  Cardiovascular status: acceptable  Respiratory status: acceptable  Hydration status: acceptable  Postoperative Nausea and Vomiting: none        There were no known notable events for this encounter.

## 2024-04-24 NOTE — CARE PLAN
The patient's goals for the shift include      The clinical goals for the shift include pain control    Problem: Pain  Goal: Turns in bed with improved pain control throughout the shift  Outcome: Progressing  Goal: Walks with improved pain control throughout the shift  Outcome: Progressing  Goal: Performs ADL's with improved pain control throughout shift  Outcome: Progressing     Problem: Pain - Adult  Goal: Verbalizes/displays adequate comfort level or baseline comfort level  Outcome: Progressing     Problem: Discharge Planning  Goal: Discharge to home or other facility with appropriate resources  Outcome: Progressing     Problem: Chronic Conditions and Co-morbidities  Goal: Patient's chronic conditions and co-morbidity symptoms are monitored and maintained or improved  Outcome: Progressing

## 2024-04-25 ENCOUNTER — APPOINTMENT (OUTPATIENT)
Dept: CARDIOLOGY | Facility: HOSPITAL | Age: 29
DRG: 417 | End: 2024-04-25
Payer: COMMERCIAL

## 2024-04-25 LAB
ALBUMIN SERPL-MCNC: 3.8 G/DL (ref 3.5–5)
ALP BLD-CCNC: 82 U/L (ref 35–125)
ALT SERPL-CCNC: 121 U/L (ref 5–40)
AMYLASE SERPL-CCNC: 54 U/L (ref 28–100)
ANION GAP SERPL CALC-SCNC: 11 MMOL/L
AST SERPL-CCNC: 47 U/L (ref 5–40)
BASOPHILS # BLD AUTO: 0.02 X10*3/UL (ref 0–0.1)
BASOPHILS NFR BLD AUTO: 0.1 %
BILIRUB DIRECT SERPL-MCNC: <0.2 MG/DL (ref 0–0.2)
BILIRUB SERPL-MCNC: 0.6 MG/DL (ref 0.1–1.2)
BUN SERPL-MCNC: 7 MG/DL (ref 8–25)
CALCIUM SERPL-MCNC: 9.2 MG/DL (ref 8.5–10.4)
CHLORIDE SERPL-SCNC: 99 MMOL/L (ref 97–107)
CO2 SERPL-SCNC: 27 MMOL/L (ref 24–31)
CREAT SERPL-MCNC: 0.7 MG/DL (ref 0.4–1.6)
EGFRCR SERPLBLD CKD-EPI 2021: >90 ML/MIN/1.73M*2
EOSINOPHIL # BLD AUTO: 0.01 X10*3/UL (ref 0–0.7)
EOSINOPHIL NFR BLD AUTO: 0.1 %
ERYTHROCYTE [DISTWIDTH] IN BLOOD BY AUTOMATED COUNT: 12.9 % (ref 11.5–14.5)
GLUCOSE SERPL-MCNC: 102 MG/DL (ref 65–99)
HCT VFR BLD AUTO: 40.9 % (ref 41–52)
HGB BLD-MCNC: 13.3 G/DL (ref 13.5–17.5)
IMM GRANULOCYTES # BLD AUTO: 0.1 X10*3/UL (ref 0–0.7)
IMM GRANULOCYTES NFR BLD AUTO: 0.5 % (ref 0–0.9)
LIPASE SERPL-CCNC: 27 U/L (ref 16–63)
LYMPHOCYTES # BLD AUTO: 1.83 X10*3/UL (ref 1.2–4.8)
LYMPHOCYTES NFR BLD AUTO: 9.6 %
MCH RBC QN AUTO: 25.9 PG (ref 26–34)
MCHC RBC AUTO-ENTMCNC: 32.5 G/DL (ref 32–36)
MCV RBC AUTO: 80 FL (ref 80–100)
MONOCYTES # BLD AUTO: 1.23 X10*3/UL (ref 0.1–1)
MONOCYTES NFR BLD AUTO: 6.4 %
NEUTROPHILS # BLD AUTO: 15.91 X10*3/UL (ref 1.2–7.7)
NEUTROPHILS NFR BLD AUTO: 83.3 %
NRBC BLD-RTO: 0 /100 WBCS (ref 0–0)
PLATELET # BLD AUTO: 297 X10*3/UL (ref 150–450)
POTASSIUM SERPL-SCNC: 4.5 MMOL/L (ref 3.4–5.1)
PROT SERPL-MCNC: 7.6 G/DL (ref 5.9–7.9)
RBC # BLD AUTO: 5.13 X10*6/UL (ref 4.5–5.9)
SODIUM SERPL-SCNC: 137 MMOL/L (ref 133–145)
WBC # BLD AUTO: 19.1 X10*3/UL (ref 4.4–11.3)

## 2024-04-25 PROCEDURE — 85025 COMPLETE CBC W/AUTO DIFF WBC: CPT | Performed by: SURGERY

## 2024-04-25 PROCEDURE — RXMED WILLOW AMBULATORY MEDICATION CHARGE

## 2024-04-25 PROCEDURE — 80048 BASIC METABOLIC PNL TOTAL CA: CPT | Performed by: SURGERY

## 2024-04-25 PROCEDURE — 2500000001 HC RX 250 WO HCPCS SELF ADMINISTERED DRUGS (ALT 637 FOR MEDICARE OP): Performed by: SURGERY

## 2024-04-25 PROCEDURE — 1100000001 HC PRIVATE ROOM DAILY

## 2024-04-25 PROCEDURE — 2500000005 HC RX 250 GENERAL PHARMACY W/O HCPCS: Performed by: INTERNAL MEDICINE

## 2024-04-25 PROCEDURE — 2500000004 HC RX 250 GENERAL PHARMACY W/ HCPCS (ALT 636 FOR OP/ED): Performed by: SURGERY

## 2024-04-25 PROCEDURE — 82248 BILIRUBIN DIRECT: CPT | Performed by: SURGERY

## 2024-04-25 PROCEDURE — 83690 ASSAY OF LIPASE: CPT | Performed by: NURSE PRACTITIONER

## 2024-04-25 PROCEDURE — 2500000001 HC RX 250 WO HCPCS SELF ADMINISTERED DRUGS (ALT 637 FOR MEDICARE OP): Performed by: ANESTHESIOLOGY

## 2024-04-25 PROCEDURE — 82150 ASSAY OF AMYLASE: CPT | Performed by: SURGERY

## 2024-04-25 PROCEDURE — S4991 NICOTINE PATCH NONLEGEND: HCPCS | Performed by: INTERNAL MEDICINE

## 2024-04-25 PROCEDURE — 2500000002 HC RX 250 W HCPCS SELF ADMINISTERED DRUGS (ALT 637 FOR MEDICARE OP, ALT 636 FOR OP/ED): Performed by: INTERNAL MEDICINE

## 2024-04-25 PROCEDURE — 36415 COLL VENOUS BLD VENIPUNCTURE: CPT | Performed by: SURGERY

## 2024-04-25 PROCEDURE — 93005 ELECTROCARDIOGRAM TRACING: CPT

## 2024-04-25 RX ORDER — OXYCODONE AND ACETAMINOPHEN 5; 325 MG/1; MG/1
1 TABLET ORAL EVERY 4 HOURS PRN
Qty: 5 TABLET | Refills: 0 | Status: SHIPPED | OUTPATIENT
Start: 2024-04-25 | End: 2024-05-09 | Stop reason: WASHOUT

## 2024-04-25 RX ORDER — IBUPROFEN 200 MG
1 TABLET ORAL DAILY
Qty: 14 PATCH | Refills: 0 | Status: SHIPPED | OUTPATIENT
Start: 2024-04-26

## 2024-04-25 RX ORDER — IBUPROFEN 200 MG
1 TABLET ORAL DAILY
Status: DISCONTINUED | OUTPATIENT
Start: 2024-04-25 | End: 2024-04-26 | Stop reason: HOSPADM

## 2024-04-25 RX ADMIN — CEFTRIAXONE SODIUM 1 G: 1 INJECTION, SOLUTION INTRAVENOUS at 16:13

## 2024-04-25 RX ADMIN — FLUOXETINE HYDROCHLORIDE 20 MG: 20 CAPSULE ORAL at 20:07

## 2024-04-25 RX ADMIN — Medication 2 L/MIN: at 07:00

## 2024-04-25 RX ADMIN — OXYCODONE HYDROCHLORIDE AND ACETAMINOPHEN 1 TABLET: 5; 325 TABLET ORAL at 10:36

## 2024-04-25 RX ADMIN — SODIUM CHLORIDE 50 ML/HR: 450 INJECTION, SOLUTION INTRAVENOUS at 10:19

## 2024-04-25 RX ADMIN — FONDAPARINUX SODIUM 2.5 MG: 2.5 INJECTION SUBCUTANEOUS at 13:30

## 2024-04-25 RX ADMIN — OXYCODONE HYDROCHLORIDE AND ACETAMINOPHEN 1 TABLET: 5; 325 TABLET ORAL at 23:45

## 2024-04-25 RX ADMIN — NICOTINE 1 PATCH: 21 PATCH, EXTENDED RELEASE TRANSDERMAL at 16:13

## 2024-04-25 RX ADMIN — ONDANSETRON HYDROCHLORIDE 4 MG: 2 INJECTION INTRAMUSCULAR; INTRAVENOUS at 12:44

## 2024-04-25 RX ADMIN — OXYCODONE HYDROCHLORIDE 10 MG: 10 TABLET, FILM COATED, EXTENDED RELEASE ORAL at 13:30

## 2024-04-25 RX ADMIN — ONDANSETRON HYDROCHLORIDE 4 MG: 2 INJECTION INTRAMUSCULAR; INTRAVENOUS at 03:59

## 2024-04-25 RX ADMIN — OXYCODONE HYDROCHLORIDE AND ACETAMINOPHEN 2 TABLET: 5; 325 TABLET ORAL at 15:18

## 2024-04-25 RX ADMIN — OXYCODONE HYDROCHLORIDE AND ACETAMINOPHEN 1 TABLET: 5; 325 TABLET ORAL at 19:48

## 2024-04-25 RX ADMIN — OXYCODONE HYDROCHLORIDE AND ACETAMINOPHEN 2 TABLET: 5; 325 TABLET ORAL at 02:27

## 2024-04-25 ASSESSMENT — COGNITIVE AND FUNCTIONAL STATUS - GENERAL
DAILY ACTIVITIY SCORE: 24
DAILY ACTIVITIY SCORE: 24
MOBILITY SCORE: 24
MOBILITY SCORE: 24

## 2024-04-25 ASSESSMENT — PAIN DESCRIPTION - LOCATION
LOCATION: ABDOMEN

## 2024-04-25 ASSESSMENT — PAIN SCALES - GENERAL
PAINLEVEL_OUTOF10: 2
PAINLEVEL_OUTOF10: 4
PAINLEVEL_OUTOF10: 2
PAINLEVEL_OUTOF10: 3
PAINLEVEL_OUTOF10: 1
PAINLEVEL_OUTOF10: 4
PAINLEVEL_OUTOF10: 3
PAINLEVEL_OUTOF10: 6
PAINLEVEL_OUTOF10: 7
PAINLEVEL_OUTOF10: 0 - NO PAIN
PAINLEVEL_OUTOF10: 3
PAINLEVEL_OUTOF10: 6

## 2024-04-25 ASSESSMENT — PAIN DESCRIPTION - ORIENTATION
ORIENTATION: RIGHT

## 2024-04-25 ASSESSMENT — PAIN - FUNCTIONAL ASSESSMENT
PAIN_FUNCTIONAL_ASSESSMENT: 0-10

## 2024-04-25 NOTE — CARE PLAN
The patient's goals for the shift include  monitor nausea    The clinical goals for the shift include pain control      Problem: Pain - Adult  Goal: Verbalizes/displays adequate comfort level or baseline comfort level  Outcome: Progressing

## 2024-04-25 NOTE — CARE PLAN
The patient's goals for the shift include      The clinical goals for the shift include pain control    Over the shift, the patient did make progress toward the fabove goal  Problem: Pain  Goal: Turns in bed with improved pain control throughout the shift  Outcome: Progressing     Problem: Pain  Goal: Walks with improved pain control throughout the shift  Outcome: Progressing   .

## 2024-04-25 NOTE — DISCHARGE INSTRUCTIONS
Continue to use your incentive spirometer  Frequently move your feet all the way up and all the way down  During the day you should be out of bed, sitting in the chair, and walking around every 1-2 hours  Starting 48 hours after surgery shower at least daily and wash your incisions well with soap and water  Allow Steri-Strips to fall off on their own  No tub baths/swimming/incisions under water for 2 weeks  Stairs as tolerated  Activity as tolerated with no sexual relations/strenuous activity/pushing/pulling/heavy lifting/lifting more than 15 pounds for 3 weeks  No driving while on pain meds  Medication as prescribed to maintain comfort level  Low 70 gm fat low cholesterol diet for 1 month  Follow-up at appointment with Dr Wheeler on 5/2 regardless and call sooner if any issues-as discussed-if you just don't feel right, if you don't feel good, if the whites of your eyes start getting yellow, if your skin starts getting yellow, if your urine starts getting dark, fevers, chills, abdominal pain, nausea, vomiting, etc., any issues as discussed  Follow-up at appointment with Dr Fernandez for possible choledocholithiasis (stones in your common bile duct) intraoperative cholangiogram xray not able to be done 4/24 during cholecystectomy (removing gallbladder) because of inflammation    Measure and record drain output at least daily and also as needed-joan zero if zero-bring amounts with you to office appt  Observe contents as discussed-should be clear and not brown or green-call if not clear or brown or green as discussed  Milk/strip drain tubing multiple times per day  Drain must be indented at all times to have suction  Make sure drain is always supported-pin, pocket, string, etc, and not just hanging

## 2024-04-25 NOTE — DISCHARGE SUMMARY
Discharge Diagnosis  Gallstone pancreatitis ([facility]-H[facility])    Issues Requiring Follow-Up  Outpatient primary care and general surgery follow-up    Discharge Meds     Your medication list        START taking these medications        Instructions Last Dose Given Next Dose Due   nicotine 14 mg/24 hr patch  Commonly known as: Nicoderm CQ  Start taking on: April 26, 2024      Place 1 patch over 24 hours on the skin once daily.       oxyCODONE-acetaminophen 5-325 mg tablet  Commonly known as: Percocet      Take 1 tablet by mouth every 4 hours if needed for moderate pain (4 - 6) for up to 7 days.              CONTINUE taking these medications        Instructions Last Dose Given Next Dose Due   Donfsana Asimtufii 960 mg/3.2 mL suspension,extended rel syring  Generic drug: ARIPiprazole (2 month)           FLUoxetine 20 mg capsule  Commonly known as: PROzac                     Where to Get Your Medications        These medications were sent to [facility]  [address], [place] [address]      Hours: 9 AM to 6 PM Mon-Fri, 9 AM to 1 PM Sat Phone: [phone]   nicotine 14 mg/24 hr patch  oxyCODONE-acetaminophen 5-325 mg tablet         Test Results Pending At Discharge  Pending Labs       Order Current Status    Surgical Pathology Exam In process            Hospital Course   28-year-old  male presents with acute abdominal pain secondary to gallstone pancreatitis was admitted to the hospital begun on IV fluids pain control IV antibiotics imaging of the abdomen with CT was obtained as well as MRCP General surgery consultation was obtained his pancreatitis resolved he underwent laparoscopic cholecystectomy with Dr. Wheeler.  Well with the surgery he was kept an additional day due to leukocytosis postop  Antibiotics and fluids was up and ambulatory tolerating a general diet medically stable for discharge    Pertinent Physical Exam At Time of Discharge  Physical Exam  Vitals and nursing note  reviewed.   Constitutional:       Appearance: Normal appearance.   HENT:      Head: Normocephalic and atraumatic.      Mouth/Throat:      Mouth: Mucous membranes are moist.   Eyes:      Extraocular Movements: Extraocular movements intact.      Pupils: Pupils are equal, round, and reactive to light.   Cardiovascular:      Rate and Rhythm: Normal rate and regular rhythm.      Pulses: Normal pulses.      Heart sounds: Normal heart sounds.   Pulmonary:      Effort: Pulmonary effort is normal.      Breath sounds: Normal breath sounds.   Abdominal:      General: Bowel sounds are normal.      Palpations: Abdomen is soft.   Musculoskeletal:         General: Normal range of motion.      Cervical back: Normal range of motion and neck supple.   Skin:     General: Skin is warm and dry.      Capillary Refill: Capillary refill takes less than 2 seconds.   Neurological:      General: No focal deficit present.      Mental Status: He is alert and oriented to person, place, and time. Mental status is at baseline.   Psychiatric:         Mood and Affect: Mood normal.         Outpatient Follow-Up  Future Appointments   Date Time Provider Department Center   5/2/2024  8:45 AM Jessica Wheeler MD [id number] [facility]   5/9/2024 10:40 AM AMIRA Lovelace-CNP [id number] [facility]         Wilmer Hillman DO

## 2024-04-25 NOTE — PROGRESS NOTES
"Jhonatan Huffman is a 28 y.o. male on day 4 of admission presenting with Gallstone pancreatitis ([facility]-HCC).    Subjective   Seen and examined doing well feeling better however is white blood cell count and liver function tests have taken a step upwards in discussion with the surgical team will keep him overnight continue current hydration and antibiotics reassess laboratories in the morning.  Anticipate discharge to home have advanced his diet but this laboratory pattern is slightly worrisome       Objective     Physical Exam  Vitals and nursing note reviewed.   Constitutional:       Appearance: Normal appearance.   HENT:      Head: Normocephalic and atraumatic.      Mouth/Throat:      Mouth: Mucous membranes are moist.   Eyes:      Extraocular Movements: Extraocular movements intact.      Pupils: Pupils are equal, round, and reactive to light.   Cardiovascular:      Rate and Rhythm: Normal rate and regular rhythm.      Pulses: Normal pulses.      Heart sounds: Normal heart sounds.   Pulmonary:      Effort: Pulmonary effort is normal.      Breath sounds: Normal breath sounds.   Abdominal:      General: Abdomen is flat.      Palpations: Abdomen is soft.   Musculoskeletal:         General: Normal range of motion.      Cervical back: Normal range of motion and neck supple.   Skin:     General: Skin is warm and dry.      Capillary Refill: Capillary refill takes less than 2 seconds.   Neurological:      General: No focal deficit present.      Mental Status: He is alert and oriented to person, place, and time. Mental status is at baseline.   Psychiatric:         Mood and Affect: Mood normal.         Last Recorded Vitals  Blood pressure 120/69, pulse 73, temperature 37.3 °C (99.1 °F), temperature source Oral, resp. rate 18, height 1.702 m (5' 7.01\"), weight 117 kg (257 lb 0.9 oz), SpO2 98%.  Intake/Output last 3 Shifts:  I/O last 3 completed shifts:  In: 2036.7 (17.5 mL/kg) [P.O.:1200; I.V.:836.7 (7.2 mL/kg)]  Out: 1455 " (12.5 mL/kg) [Urine:1325 (0.3 mL/kg/hr); Emesis/NG output:20; Drains:60; Blood:50]  Weight: 116.6 kg     Relevant Results              Results for orders placed or performed during the hospital encounter of 04/21/24 (from the past 24 hour(s))   Amylase   Result Value Ref Range    Amylase 54 28 - 100 U/L   Hepatic function panel   Result Value Ref Range    AST 47 (H) 5 - 40 U/L     (H) 5 - 40 U/L    Alkaline Phosphatase 82 35 - 125 U/L    Bilirubin, Total 0.6 0.1 - 1.2 mg/dL    Bilirubin, Direct <0.2 0.0 - 0.2 mg/dL    Total Protein 7.6 5.9 - 7.9 g/dL    Albumin 3.8 3.5 - 5.0 g/dL   Basic Metabolic Panel   Result Value Ref Range    Glucose 102 (H) 65 - 99 mg/dL    Sodium 137 133 - 145 mmol/L    Potassium 4.5 3.4 - 5.1 mmol/L    Chloride 99 97 - 107 mmol/L    Bicarbonate 27 24 - 31 mmol/L    Urea Nitrogen 7 (L) 8 - 25 mg/dL    Creatinine 0.70 0.40 - 1.60 mg/dL    eGFR >90 >60 mL/min/1.73m*2    Calcium 9.2 8.5 - 10.4 mg/dL    Anion Gap 11 <=19 mmol/L   CBC and Auto Differential   Result Value Ref Range    WBC 19.1 (H) 4.4 - 11.3 x10*3/uL    nRBC 0.0 0.0 - 0.0 /100 WBCs    RBC 5.13 4.50 - 5.90 x10*6/uL    Hemoglobin 13.3 (L) 13.5 - 17.5 g/dL    Hematocrit 40.9 (L) 41.0 - 52.0 %    MCV 80 80 - 100 fL    MCH 25.9 (L) 26.0 - 34.0 pg    MCHC 32.5 32.0 - 36.0 g/dL    RDW 12.9 11.5 - 14.5 %    Platelets 297 150 - 450 x10*3/uL    Neutrophils % 83.3 40.0 - 80.0 %    Immature Granulocytes %, Automated 0.5 0.0 - 0.9 %    Lymphocytes % 9.6 13.0 - 44.0 %    Monocytes % 6.4 2.0 - 10.0 %    Eosinophils % 0.1 0.0 - 6.0 %    Basophils % 0.1 0.0 - 2.0 %    Neutrophils Absolute 15.91 (H) 1.20 - 7.70 x10*3/uL    Immature Granulocytes Absolute, Automated 0.10 0.00 - 0.70 x10*3/uL    Lymphocytes Absolute 1.83 1.20 - 4.80 x10*3/uL    Monocytes Absolute 1.23 (H) 0.10 - 1.00 x10*3/uL    Eosinophils Absolute 0.01 0.00 - 0.70 x10*3/uL    Basophils Absolute 0.02 0.00 - 0.10 x10*3/uL   Lipase   Result Value Ref Range    Lipase 27 16 - 63  U/L     Scheduled medications  cefTRIAXone, 1 g, intravenous, q24h  FLUoxetine, 20 mg, oral, Nightly  fondaparinux, 2.5 mg, subcutaneous, q24h  nicotine, 1 patch, transdermal, Daily  ondansetron, 4 mg, intravenous, q4h  oxygen, , inhalation, q8h      Continuous medications  sodium chloride, 50 mL/hr, Last Rate: 50 mL/hr (04/25/24 1019)      PRN medications  PRN medications: HYDROmorphone, oxyCODONE ER, oxyCODONE-acetaminophen, oxyCODONE-acetaminophen                   Assessment/Plan   Principal Problem:    Gallstone pancreatitis ([facility]-HCC)    Significant leukocytosis today likely postoperative demargination continue present medications recheck laboratories in the morning advance diet and monitor spitted discharge tomorrow       I spent 45 minutes in the professional and overall care of this patient.      Wilmer Hillman DO

## 2024-04-25 NOTE — PROGRESS NOTES
Jhonatan Huffman is a 28 y.o. male on day 4 of admission presenting with Gallstone pancreatitis (HHS-HCC).    Subjective   Feels pretty good.  Postop pain is okay.  Preop pain is gone.  Taking his regular diet pretty good.  No nausea or vomiting.    No angina, shortness of breath, bleeding, calf pain, voiding problems.  Passing gas.  No bowel movements.  Walking in the halls and using his I-S.  Feels well enough for discharge today.  Objective     Vital signs in last 24 hours:  Temp:  [36.3 °C (97.3 °F)-36.8 °C (98.2 °F)] 36.5 °C (97.7 °F)  Heart Rate:  [] 83  Resp:  [11-24] 18  BP: (103-156)/(65-91) 130/82  FiO2 (%):  [28 %] 28 %  Heart Rate:  []   Temp:  [36.3 °C (97.3 °F)-36.8 °C (98.2 °F)]   Resp:  [11-24]   BP: (103-156)/(65-91)   SpO2:  [88 %-100 %]      Intake/Output last 3 Shifts:  I/O last 3 completed shifts:  In: 2036.7 (17.5 mL/kg) [P.O.:1200; I.V.:836.7 (7.2 mL/kg)]  Out: 1455 (12.5 mL/kg) [Urine:1325 (0.3 mL/kg/hr); Emesis/NG output:20; Drains:60; Blood:50]  Weight: 116.6 kg       Physical Exam  Was just out walking in the halls  No acute distress  Not septic appearing  Looks fine and comfortable  Alert and oriented  Heart regular  Lungs clear  No edema  Abdomen soft, positive bowel sounds, very mildly distended consistent with surgery, nontender, Steri-Stripped incisions are without any issues, dressing with a little bit of drainage on it-student at bedside and states that her and her instructor were going to change and would like to change and that she needs to wait for her instructor for-contents clear brownish due to surgical powder used      Scheduled medications  cefTRIAXone, 1 g, intravenous, q24h  FLUoxetine, 20 mg, oral, Nightly  fondaparinux, 2.5 mg, subcutaneous, q24h  nicotine, 1 patch, transdermal, Daily  ondansetron, 4 mg, intravenous, q4h  oxygen, , inhalation, q8h      Continuous medications  sodium chloride, 50 mL/hr, Last Rate: 50 mL/hr (04/25/24 1019)      PRN  medications  PRN medications: HYDROmorphone, oxyCODONE ER, oxyCODONE-acetaminophen, oxyCODONE-acetaminophen    Relevant Results  Results from last 7 days   Lab Units 04/25/24  0422 04/24/24 0430 04/23/24 0422   WBC AUTO x10*3/uL 19.1* 15.4* 15.6*   HEMOGLOBIN g/dL 13.3* 13.5 13.6   HEMATOCRIT % 40.9* 41.5 42.0   PLATELETS AUTO x10*3/uL 297 239 250      Results from last 7 days   Lab Units 04/25/24  0422 04/24/24  0429 04/23/24 0422   SODIUM mmol/L 137 136 137   POTASSIUM mmol/L 4.5 4.1 4.3   CHLORIDE mmol/L 99 100 101   CO2 mmol/L 27 28 26   BUN mg/dL 7* 5* 6*   CREATININE mg/dL 0.70 0.80 0.80   GLUCOSE mg/dL 102* 102* 97   CALCIUM mg/dL 9.2 8.8 8.7       Assessment/Plan   Principal Problem:    Gallstone pancreatitis (HHS-HCC)  Resolved-postop day 1 status post laparoscopic cholecystectomy and primary repair of umbilical hernia 4/24  Due to significant inflammation unable to obtain intraoperative cholangiograms  Pathology pending  4/21 CT abdomen pelvis with IV contrast:  Mild diffuse acute interstitial edematous pancreatitis without fluid collection  4/21 right upper quadrant ultrasound:  Cholelithiasis and slightly thickened gallbladder wall without definite sonographic Lucas sign.  4/22 MRCP:  Cholelithiasis and possible nonobstructing distal CBD cholelithiasis/sludge without biliary obstruction or evidence of acute cholecystitis.   Nearly resolved acute interstitial pancreatitis without acute peripancreatic fluid collection.  Hemodynamically stable  Amylase 54 from 79  Lipase 27 from 58  Regular diet  IV fluids  Is on Rocephin via IM  Pain/nausea meds  I-S  VIN-strip q8-home-going instructions reviewed with patient  Ambulate in halls  Morning CBC, BMP, hepatic function panel ordered      Elevated LFTs  Due to/see above  Improving   from 146  AST 47 from 24  The rest of LFTs normal  Recheck in morning      Leukocytosis  Due to gallstone pancreatitis and acute cholecystitis patient had and also postop  stress response from cholecystectomy  19.1 from 15.4  Is on Rocephin via IM  Recheck in morning      Smoker  COPD on 2/29/24 CT  I-S  OOB and ambulate in halls  Discussed with patient again that COPD was found on his CT and advised smoking cessation and discussed with patient I ordered smoking cessation for him to help him quit.   Also reiterated with patient again today that continuing to smoke will also increase his risk of surgery healing complications      Umbilical hernia  Repaired 4/24 concurrently with cholecystectomy      DVT prophylaxis  SCDs  Ambulate  Arixtra      1038 addendum-per IM patient going to stay today due to leukocytosis and repeat labs in the morning      AMIRA Blevins-CNP  Agree with staying an additional night for the leukocytosis.  In addition patient now having some nausea.  Will repeat LFTs and CBC tomorrow.  Likely discharge tomorrow with drain.

## 2024-04-25 NOTE — PROGRESS NOTES
04/25/24 1631   Discharge Planning   Living Arrangements Parent  (lives with grandparents)   Support Systems Parent;Family members   Assistance Needed Independent   Type of Residence Private residence   Do you have animals or pets at home? Yes   Home or Post Acute Services None   Patient expects to be discharged to: Home with no needs   Does the patient need discharge transport arranged? No     Antibiotics and fluids.  Observed ambulating in the hallway,  tolerating a general diet, will be discharged tomorrow. Plan: discharge to home with no needs

## 2024-04-26 ENCOUNTER — PHARMACY VISIT (OUTPATIENT)
Dept: PHARMACY | Facility: CLINIC | Age: 29
End: 2024-04-26
Payer: COMMERCIAL

## 2024-04-26 VITALS
TEMPERATURE: 97.9 F | RESPIRATION RATE: 18 BRPM | HEART RATE: 89 BPM | SYSTOLIC BLOOD PRESSURE: 128 MMHG | DIASTOLIC BLOOD PRESSURE: 65 MMHG | HEIGHT: 67 IN | WEIGHT: 257.06 LBS | OXYGEN SATURATION: 98 % | BODY MASS INDEX: 40.35 KG/M2

## 2024-04-26 LAB
ALBUMIN SERPL-MCNC: 3.8 G/DL (ref 3.5–5)
ALP BLD-CCNC: 65 U/L (ref 35–125)
ALT SERPL-CCNC: 84 U/L (ref 5–40)
AMYLASE SERPL-CCNC: 44 U/L (ref 28–100)
ANION GAP SERPL CALC-SCNC: 11 MMOL/L
AST SERPL-CCNC: 29 U/L (ref 5–40)
BASOPHILS # BLD AUTO: 0.06 X10*3/UL (ref 0–0.1)
BASOPHILS NFR BLD AUTO: 0.4 %
BILIRUB DIRECT SERPL-MCNC: <0.2 MG/DL (ref 0–0.2)
BILIRUB SERPL-MCNC: 0.5 MG/DL (ref 0.1–1.2)
BUN SERPL-MCNC: 9 MG/DL (ref 8–25)
CALCIUM SERPL-MCNC: 8.8 MG/DL (ref 8.5–10.4)
CHLORIDE SERPL-SCNC: 101 MMOL/L (ref 97–107)
CO2 SERPL-SCNC: 25 MMOL/L (ref 24–31)
CREAT SERPL-MCNC: 0.8 MG/DL (ref 0.4–1.6)
EGFRCR SERPLBLD CKD-EPI 2021: >90 ML/MIN/1.73M*2
EOSINOPHIL # BLD AUTO: 0.31 X10*3/UL (ref 0–0.7)
EOSINOPHIL NFR BLD AUTO: 2.2 %
ERYTHROCYTE [DISTWIDTH] IN BLOOD BY AUTOMATED COUNT: 13.1 % (ref 11.5–14.5)
GLUCOSE SERPL-MCNC: 101 MG/DL (ref 65–99)
HCT VFR BLD AUTO: 40 % (ref 41–52)
HGB BLD-MCNC: 13.2 G/DL (ref 13.5–17.5)
IMM GRANULOCYTES # BLD AUTO: 0.06 X10*3/UL (ref 0–0.7)
IMM GRANULOCYTES NFR BLD AUTO: 0.4 % (ref 0–0.9)
LABORATORY COMMENT REPORT: NORMAL
LIPASE SERPL-CCNC: 36 U/L (ref 16–63)
LYMPHOCYTES # BLD AUTO: 3.26 X10*3/UL (ref 1.2–4.8)
LYMPHOCYTES NFR BLD AUTO: 23.1 %
MCH RBC QN AUTO: 26.6 PG (ref 26–34)
MCHC RBC AUTO-ENTMCNC: 33 G/DL (ref 32–36)
MCV RBC AUTO: 81 FL (ref 80–100)
MONOCYTES # BLD AUTO: 1.12 X10*3/UL (ref 0.1–1)
MONOCYTES NFR BLD AUTO: 7.9 %
NEUTROPHILS # BLD AUTO: 9.31 X10*3/UL (ref 1.2–7.7)
NEUTROPHILS NFR BLD AUTO: 66 %
NRBC BLD-RTO: 0 /100 WBCS (ref 0–0)
PATH REPORT.FINAL DX SPEC: NORMAL
PATH REPORT.GROSS SPEC: NORMAL
PATH REPORT.RELEVANT HX SPEC: NORMAL
PATH REPORT.TOTAL CANCER: NORMAL
PLATELET # BLD AUTO: 305 X10*3/UL (ref 150–450)
POTASSIUM SERPL-SCNC: 4 MMOL/L (ref 3.4–5.1)
PROT SERPL-MCNC: 7 G/DL (ref 5.9–7.9)
RBC # BLD AUTO: 4.97 X10*6/UL (ref 4.5–5.9)
SODIUM SERPL-SCNC: 137 MMOL/L (ref 133–145)
WBC # BLD AUTO: 14.1 X10*3/UL (ref 4.4–11.3)

## 2024-04-26 PROCEDURE — 2500000001 HC RX 250 WO HCPCS SELF ADMINISTERED DRUGS (ALT 637 FOR MEDICARE OP): Performed by: SURGERY

## 2024-04-26 PROCEDURE — RXMED WILLOW AMBULATORY MEDICATION CHARGE

## 2024-04-26 PROCEDURE — 82248 BILIRUBIN DIRECT: CPT | Performed by: NURSE PRACTITIONER

## 2024-04-26 PROCEDURE — 99024 POSTOP FOLLOW-UP VISIT: CPT | Performed by: NURSE PRACTITIONER

## 2024-04-26 PROCEDURE — 36415 COLL VENOUS BLD VENIPUNCTURE: CPT | Performed by: NURSE PRACTITIONER

## 2024-04-26 PROCEDURE — 80053 COMPREHEN METABOLIC PANEL: CPT | Performed by: INTERNAL MEDICINE

## 2024-04-26 PROCEDURE — 83690 ASSAY OF LIPASE: CPT | Performed by: NURSE PRACTITIONER

## 2024-04-26 PROCEDURE — 85025 COMPLETE CBC W/AUTO DIFF WBC: CPT | Performed by: NURSE PRACTITIONER

## 2024-04-26 PROCEDURE — 82150 ASSAY OF AMYLASE: CPT | Performed by: NURSE PRACTITIONER

## 2024-04-26 RX ORDER — LEVOFLOXACIN 500 MG/1
500 TABLET, FILM COATED ORAL DAILY
Qty: 5 TABLET | Refills: 0 | Status: SHIPPED | OUTPATIENT
Start: 2024-04-26 | End: 2024-05-09 | Stop reason: WASHOUT

## 2024-04-26 RX ADMIN — OXYCODONE HYDROCHLORIDE AND ACETAMINOPHEN 1 TABLET: 5; 325 TABLET ORAL at 08:36

## 2024-04-26 RX ADMIN — OXYCODONE HYDROCHLORIDE AND ACETAMINOPHEN 1 TABLET: 5; 325 TABLET ORAL at 04:04

## 2024-04-26 ASSESSMENT — PAIN - FUNCTIONAL ASSESSMENT
PAIN_FUNCTIONAL_ASSESSMENT: WONG-BAKER FACES
PAIN_FUNCTIONAL_ASSESSMENT: 0-10
PAIN_FUNCTIONAL_ASSESSMENT: WONG-BAKER FACES
PAIN_FUNCTIONAL_ASSESSMENT: 0-10
PAIN_FUNCTIONAL_ASSESSMENT: 0-10

## 2024-04-26 ASSESSMENT — PAIN SCALES - GENERAL
PAINLEVEL_OUTOF10: 2
PAINLEVEL_OUTOF10: 0 - NO PAIN
PAINLEVEL_OUTOF10: 3
PAINLEVEL_OUTOF10: 4

## 2024-04-26 ASSESSMENT — PAIN DESCRIPTION - ORIENTATION
ORIENTATION: RIGHT
ORIENTATION: RIGHT

## 2024-04-26 ASSESSMENT — PAIN DESCRIPTION - LOCATION
LOCATION: ABDOMEN
LOCATION: ABDOMEN

## 2024-04-26 ASSESSMENT — PAIN SCALES - WONG BAKER
WONGBAKER_NUMERICALRESPONSE: NO HURT
WONGBAKER_NUMERICALRESPONSE: NO HURT

## 2024-04-26 ASSESSMENT — COGNITIVE AND FUNCTIONAL STATUS - GENERAL
MOBILITY SCORE: 24
DAILY ACTIVITIY SCORE: 24

## 2024-04-26 ASSESSMENT — PAIN SCALES - PAIN ASSESSMENT IN ADVANCED DEMENTIA (PAINAD): TOTALSCORE: MEDICATION (SEE MAR)

## 2024-04-26 NOTE — PROGRESS NOTES
Jhonatan Huffman is a 28 y.o. male on day 5 of admission presenting with Gallstone pancreatitis (HHS-HCC).    Subjective   Feels pretty good and the same as yesterday.  Postop pain is okay.  Percocet does not make him nauseated.  Taking his regular low fat diet good without any further nausea or vomiting since seen yesterday.  Passing gas.  Had 2 bowel movements yesterday.  No angina, shortness of breath, calf pain, bleeding, voiding problems.  Walking in halls and using I-S.  Objective     Vital signs in last 24 hours:  Temp:  [36.3 °C (97.3 °F)-37.3 °C (99.1 °F)] 36.6 °C (97.9 °F)  Heart Rate:  [73-89] 89  Resp:  [17-18] 18  BP: (109-128)/(52-71) 128/65  Heart Rate:  [73-89]   Temp:  [36.3 °C (97.3 °F)-37.3 °C (99.1 °F)]   Resp:  [17-18]   BP: (109-128)/(52-71)   SpO2:  [93 %-100 %]      Intake/Output last 3 Shifts:  I/O last 3 completed shifts:  In: 1707.5 (14.6 mL/kg) [P.O.:240; I.V.:1467.5 (12.6 mL/kg)]  Out: 115 (1 mL/kg) [Drains:115]  Weight: 116.6 kg       Physical Exam  No acute distress  Nonseptic appearing  Looks fine and comfortable  Alert and oriented  Heart regular  Lungs clear  No edema  Abdomen soft, positive bowel sounds, very mildly distended consistent with surgery, nontender, Steri-Stripped incisions are all without any issues, drain with clear brownish-from surgical powder-resolving      Scheduled medications  cefTRIAXone, 1 g, intravenous, q24h  FLUoxetine, 20 mg, oral, Nightly  fondaparinux, 2.5 mg, subcutaneous, q24h  nicotine, 1 patch, transdermal, Daily  nicotine, 1 patch, transdermal, Daily  ondansetron, 4 mg, intravenous, q4h  oxygen, , inhalation, q8h      Continuous medications  sodium chloride, 50 mL/hr, Last Rate: 50 mL/hr (04/26/24 0549)      PRN medications  PRN medications: HYDROmorphone, oxyCODONE-acetaminophen, oxyCODONE-acetaminophen    Relevant Results  Results from last 7 days   Lab Units 04/26/24  0749 04/25/24  0422 04/24/24  0430   WBC AUTO x10*3/uL 14.1* 19.1* 15.4*    HEMOGLOBIN g/dL 13.2* 13.3* 13.5   HEMATOCRIT % 40.0* 40.9* 41.5   PLATELETS AUTO x10*3/uL 305 297 239      Results from last 7 days   Lab Units 04/26/24  0749 04/25/24  0422 04/24/24  0429   SODIUM mmol/L 137 137 136   POTASSIUM mmol/L 4.0 4.5 4.1   CHLORIDE mmol/L 101 99 100   CO2 mmol/L 25 27 28   BUN mg/dL 9 7* 5*   CREATININE mg/dL 0.80 0.70 0.80   GLUCOSE mg/dL 101* 102* 102*   CALCIUM mg/dL 8.8 9.2 8.8       Assessment/Plan   Principal Problem:    Gallstone pancreatitis (HHS-HCC)  Resolved-postop day 2 status post laparoscopic cholecystectomy and primary repair of umbilical hernia 4/24  Due to significant inflammation unable to obtain intraoperative cholangiograms  Pathology pending  4/21 CT abdomen pelvis with IV contrast:  Mild diffuse acute interstitial edematous pancreatitis without fluid collection  4/21 right upper quadrant ultrasound:  Cholelithiasis and slightly thickened gallbladder wall without definite sonographic Lucas sign.  4/22 MRCP:  Cholelithiasis and possible nonobstructing distal CBD cholelithiasis/sludge without biliary obstruction or evidence of acute cholecystitis.   Nearly resolved acute interstitial pancreatitis without acute peripancreatic fluid collection.  Hemodynamically stable  Amylase 44 from 54  Lipase 36 from 27  Low fat diet  Is on Rocephin via IM-Will give Levaquin prescription for discharge  Percocet discharge prescription already done by IM  I-S  VIN-strip q8-home-going instructions reviewed with patient  Ambulate in halls  OK for discharge from general surgery standpoint-see discharge instructions and follow-up-also discussed with patient Dr Fernandez appointment-also reviewed with his mother. Secure chat message sent to Dr Fernandez and his NP Leila Diez telling them about why patient will be coming to see them      Elevated LFTs  Due to/see above  Improving  ALT 85 from 121  The rest of LFTs normal      Leukocytosis  Due to gallstone pancreatitis and acute cholecystitis  patient had and also postop stress response from cholecystectomy  14.1 from 19.1  Is on Rocephin via IM-will send on home on Levaquin      Smoker  COPD on 2/29/24 CT  I-S  OOB and ambulate in halls  Discussed with patient that COPD was found on his CT and advised smoking cessation and discussed with patient I ordered smoking cessation for him to help him quit.   Also reiterated with patient that continuing to smoke will also increase his risk of surgery healing complications      Umbilical hernia  Repaired 4/24 concurrently with cholecystectomy      DVT prophylaxis  SCDs  Ambulate  Rolly Parks, APRN-CNP

## 2024-04-26 NOTE — DOCUMENTATION CLARIFICATION NOTE
"    PATIENT:               MARÍA BILL  ACCT #:                  [id number]  MRN:                       [id number]  :                       1995  ADMIT DATE:       2024 12:35 PM  DISCH DATE:  RESPONDING PROVIDER #:        [id number]          PROVIDER RESPONSE TEXT:    Non-infectious SIRS with acute organ dysfunction of Gallstone Pancreatitis (non-infectious) as evident by (elevated HR & Leukocytosis)    CDI QUERY TEXT:    Clarification    Instruction:    Based on your assessment of the patient and the clinical information, please provide the requested documentation by clicking on the appropriate radio button and enter any additional information if prompted.    Question: Please further clarify if there is a diagnosis related to the clinical information    When answering this query, please exercise your independent professional judgment. The fact that a question is being asked, does not imply that any particular answer is desired or expected.    The patient's clinical indicators include:  Clinical Information: 28y.o. M presents to ED with abd pain & N/V/D.  Admitting dx Gallstone Pancreatitis. s/p  Lap Cholecystectomy, Umbilical hernia repair, EBL 50 ml.         VS: 36.6, 79, 20, 118/73 Map 88, 98% room air     (H&P): \" Gallstone pancreatitis, Cholelithiasis, Pancreatitis with abdominal pain and nausea/vomiting. Pain control, Zofran for nausea. Clear liquid diet, N.p.o. past midnight tonight. D5 LR with 20 of K at 125 mL/h. Rocephin \"     (CT Abdomen): \" Mild diffuse acute interstitial edematous pancreatitis without fluid collection. \"     (Op Note): \" Patient was significantly inflamed gallbladder consistent with acute cholecystitis. Due to significant inflammation small part of the fundus back wall was left on the liver. \"     (Progress Note): \" Leukocytosis - Due to gallstone pancreatitis and acute cholecystitis patient had and also postop stress response from cholecystectomy \"     " "(Query response): \" Acute Pancreatitis with non-infectious origin \"    Clinical Indicators:  4/22 (HR): 100, 97  4/22 (RR): 21, 20  4/23 (HR): 92, 93, 96, 97  4/24 (HR): 90, 104, 105, 95, 93, 92, 97, 102, 93, 91    4/21 H&H 16.1/46.8, WBC 15.7, Plts 305  4/22 H&H 14.5/43.4, WBC 14.5, Plts 268  4/23 H&H 13.6/42.0, WBC 15.6, Plts 250  4/24 H&H 13.5/41.5, WBC 15.4, Plts 239  4/25 H&H 13.3/40.9, WBC 19.1, Plts 297  4/26 H&H 13.2/40.0, WBC 14.1, Plts 305    Treatment: (EPIC MAR Info)  4/21-4/23 Ceftriaxone 1gm IV x3 dose  4/24 Cefazolin 2gm IV x1 dose    Risk Factors: PMHx: Sleep apnea, Depression, Paranoid Schizophrenia, Umbilical hernia, COPD, Prior drug use (not currently), Current daily smoker, Morbid obesity.   BMI 40.25  Options provided:  -- Non-infectious SIRS with acute organ dysfunction of Gallstone Pancreatitis (non-infectious) as evident by (elevated HR & Leukocytosis)  -- Non-infectious SIRS without acute organ dysfunction  -- Other comments, please provide details here  -- Other - I will add my own diagnosis  -- Refer to Clinical Documentation Reviewer    Query created by: Tricia Martinez on 4/26/2024 10:09 AM      Electronically signed by:  NITA CINTRON DO 4/26/2024 10:12 AM          "

## 2024-04-26 NOTE — DOCUMENTATION CLARIFICATION NOTE
"    PATIENT:               MARÍA BILL  ACCT #:                  [id number]  MRN:                       [id number]  :                       1995  ADMIT DATE:       2024 12:35 PM  DISCH DATE:  RESPONDING PROVIDER #:        [id number]          PROVIDER RESPONSE TEXT:    Acute Pancreatitis with non-infectious origin    CDI QUERY TEXT:    Clarification    Instruction:    Based on your assessment of the patient and the clinical information, please provide the requested documentation by clicking on the appropriate radio button and enter any additional information if prompted.    Question: Please further clarify the acuity of Pancreatitis    When answering this query, please exercise your independent professional judgment. The fact that a question is being asked, does not imply that any particular answer is desired or expected.      The patient's clinical indicators include:  Clinical Information: 28y.o. M presents to ED with abd pain & N/V/D.  Admitting dx Gallstone Pancreatitis. s/p  Lap Cholecystectomy, Umbilical hernia repair, EBL 50 ml.         VS: 36.6, 79, 20, 118/73 Map 88, 98% room air     (H&P): \" Gallstone pancreatitis, Cholelithiasis, Pancreatitis with abdominal pain and nausea/vomiting. Pain control, Zofran for nausea. Clear liquid diet, N.p.o. past midnight tonight. D5 LR with 20 of K at 125 mL/h. Rocephin \"     (CT Abdomen): \" Mild diffuse acute interstitial edematous pancreatitis without fluid collection. \"     (MRCP): \" Cholelithiasis and possible non-obstructing distal CBD cholelithiasis/sludge without biliary obstruction or evidence of  acute cholecystitis. Nearly resolved acute interstitial pancreatitis without acute peripancreatic fluid collection. \"     (Op Note): \" Patient was significantly inflamed gallbladder consistent with acute cholecystitis. Due to significant inflammation small part of the fundus back wall was left on the liver. \"    Clinical Indicators:   " (HR): 100, 97  4/23 (HR): 92, 93, 96, 97  4/24 (HR): 90, 104, 105, 95, 93, 92, 97, 102, 93, 91    4/21 H&H 16.1/46.8, WBC 15.7, Plts 305  4/22 H&H 14.5/43.4, WBC 14.5, Plts 268  4/23 H&H 13.6/42.0, WBC 15.6, Plts 250  4/24 H&H 13.5/41.5, WBC 15.4, Plts 239    4/21 , , Bilirubin (total) 2.8, Lipase 3000  4/22 , , Bilirubin (total) 0.9, Lipase   856, Amylase 467  4/23 , AST   53, Bilirubin (total) 0.8, Lipase   110, Amylase 141  4/24 , AST   24, Bilirubin (total) 0.6, Lipase     58, Amylase   79    Treatment: (EPIC MAR Info)  4/21-4/23 Ceftriaxone 1gm IV x3 dose  4/24 Cefazolin 2gm IV x1 dose    Risk Factors: PMHx: Sleep apnea, Depression, Paranoid Schizophrenia, Umbilical hernia, COPD, Prior drug use (not currently), Current daily smoker, Morbid obesity.   BMI 40.25  Options provided:  -- Acute Pancreatitis with infectious origin evident by (elevated HR, elevated WBC, prescribed IV Ceftriaxone & IV Ancef)  -- Acute Pancreatitis with non-infectious origin  -- Other comments, please provide details here  -- Other - I will add my own diagnosis  -- Refer to Clinical Documentation Reviewer    Query created by: Tricia Martinez on 4/26/2024 8:06 AM      Electronically signed by:  NITA CINTRON DO 4/26/2024 8:28 AM

## 2024-04-26 NOTE — CARE PLAN
The patient's goals for the shift include  control pain    The clinical goals for the shift include pain control    Problem: Pain  Goal: Turns in bed with improved pain control throughout the shift  Outcome: Progressing  Goal: Walks with improved pain control throughout the shift  Outcome: Progressing  Goal: Performs ADL's with improved pain control throughout shift  Outcome: Progressing

## 2024-04-26 NOTE — PROGRESS NOTES
Jhonatan Huffman is a 28 y.o. male on day 5 of admission presenting with Gallstone pancreatitis (HHS-HCC).    Subjective   Seen and examined feeling well tolerating general diet laboratories improved medically stable for discharge     Results for orders placed or performed during the hospital encounter of 04/21/24 (from the past 24 hour(s))   CBC and Auto Differential   Result Value Ref Range    WBC 14.1 (H) 4.4 - 11.3 x10*3/uL    nRBC 0.0 0.0 - 0.0 /100 WBCs    RBC 4.97 4.50 - 5.90 x10*6/uL    Hemoglobin 13.2 (L) 13.5 - 17.5 g/dL    Hematocrit 40.0 (L) 41.0 - 52.0 %    MCV 81 80 - 100 fL    MCH 26.6 26.0 - 34.0 pg    MCHC 33.0 32.0 - 36.0 g/dL    RDW 13.1 11.5 - 14.5 %    Platelets 305 150 - 450 x10*3/uL    Neutrophils % 66.0 40.0 - 80.0 %    Immature Granulocytes %, Automated 0.4 0.0 - 0.9 %    Lymphocytes % 23.1 13.0 - 44.0 %    Monocytes % 7.9 2.0 - 10.0 %    Eosinophils % 2.2 0.0 - 6.0 %    Basophils % 0.4 0.0 - 2.0 %    Neutrophils Absolute 9.31 (H) 1.20 - 7.70 x10*3/uL    Immature Granulocytes Absolute, Automated 0.06 0.00 - 0.70 x10*3/uL    Lymphocytes Absolute 3.26 1.20 - 4.80 x10*3/uL    Monocytes Absolute 1.12 (H) 0.10 - 1.00 x10*3/uL    Eosinophils Absolute 0.31 0.00 - 0.70 x10*3/uL    Basophils Absolute 0.06 0.00 - 0.10 x10*3/uL   Comprehensive Metabolic Panel   Result Value Ref Range    Glucose 101 (H) 65 - 99 mg/dL    Sodium 137 133 - 145 mmol/L    Potassium 4.0 3.4 - 5.1 mmol/L    Chloride 101 97 - 107 mmol/L    Bicarbonate 25 24 - 31 mmol/L    Urea Nitrogen 9 8 - 25 mg/dL    Creatinine 0.80 0.40 - 1.60 mg/dL    eGFR >90 >60 mL/min/1.73m*2    Calcium 8.8 8.5 - 10.4 mg/dL    Albumin 3.8 3.5 - 5.0 g/dL    Alkaline Phosphatase 65 35 - 125 U/L    Total Protein 7.0 5.9 - 7.9 g/dL    AST 29 5 - 40 U/L    Bilirubin, Total 0.5 0.1 - 1.2 mg/dL    ALT 84 (H) 5 - 40 U/L    Anion Gap 11 <=19 mmol/L   Amylase   Result Value Ref Range    Amylase 44 28 - 100 U/L   Lipase   Result Value Ref Range    Lipase 36 16 - 63  "U/L   Bilirubin, Direct   Result Value Ref Range    Bilirubin, Direct <0.2 0.0 - 0.2 mg/dL         Objective     Physical Exam  Vitals and nursing note reviewed.   Constitutional:       Appearance: Normal appearance.   HENT:      Head: Normocephalic and atraumatic.      Mouth/Throat:      Mouth: Mucous membranes are moist.   Eyes:      Extraocular Movements: Extraocular movements intact.      Pupils: Pupils are equal, round, and reactive to light.   Cardiovascular:      Rate and Rhythm: Normal rate and regular rhythm.      Pulses: Normal pulses.      Heart sounds: Normal heart sounds.   Pulmonary:      Effort: Pulmonary effort is normal.      Breath sounds: Normal breath sounds.   Abdominal:      General: Abdomen is flat.      Palpations: Abdomen is soft.   Musculoskeletal:         General: Normal range of motion.   Skin:     General: Skin is warm and dry.      Capillary Refill: Capillary refill takes less than 2 seconds.   Neurological:      General: No focal deficit present.      Mental Status: He is alert and oriented to person, place, and time. Mental status is at baseline.   Psychiatric:         Mood and Affect: Mood normal.         Last Recorded Vitals  Blood pressure 128/65, pulse 89, temperature 36.6 °C (97.9 °F), temperature source Oral, resp. rate 18, height 1.702 m (5' 7.01\"), weight 117 kg (257 lb 0.9 oz), SpO2 98%.  Intake/Output last 3 Shifts:  I/O last 3 completed shifts:  In: 1707.5 (14.6 mL/kg) [P.O.:240; I.V.:1467.5 (12.6 mL/kg)]  Out: 115 (1 mL/kg) [Drains:115]  Weight: 116.6 kg     Relevant Results                             Assessment/Plan   Principal Problem:    Gallstone pancreatitis (HHS-HCC)    Status post laparoscopic cholecystectomy    Leukocytosis resolved liver function test improved please stable for discharge       I spent 45 minutes in the professional and overall care of this patient.      Wilmer Hillman,       "

## 2024-05-01 PROBLEM — K80.10 CALCULUS OF GALLBLADDER WITH CHRONIC CHOLECYSTITIS WITHOUT OBSTRUCTION: Status: ACTIVE | Noted: 2024-05-01

## 2024-05-01 NOTE — ASSESSMENT & PLAN NOTE
Status post lap for scopic cholecystectomy.  Incisions healed nicely.  VIN drain pulled.  Patient to still do no lifting over 15 pounds until 3 weeks out from surgery.  Patient to follow-up as needed.  Patient had question of very small filling defect near the distal common bile duct on MRCP.  His LFTs came down very nicely.  An attempt was made to shoot intraoperative cholangiograms however there was so much inflammation I was concerned about the integrity of the cystic duct when doing the dissection therefore the cholangiogram was abandoned.  LFTs were repeated while he was still in the hospital and came down very nicely.  Patient is currently asymptomatic today.  He does however have a follow-up with GI for discussion of possible ERCP for the questionable filling defects in the distal common bile duct.  This may be managed conservatively with repeat LFTs versus repeat MRCP versus ERCP.

## 2024-05-01 NOTE — PROGRESS NOTES
Subjective   Patient ID: Jhonatan Huffman is a 28 y.o. male who presents for post op.  HPI    S/P lap arabella on 4/24/24; needs drain removed     FINAL DIAGNOSIS   A. GALLBLADDER, CHOLECYSTECTOMY:   -- Chronic cholecystitis and cholelithiasis.     Past Surgical History:   Procedure Laterality Date    CHOLECYSTECTOMY N/A 04/24/2024    Dr. Wheeler     Social History:  Tobacco Use - yes  Do you use any recreational drugs - no-prev. Hx of drug use  Alcohol Use - no  Caffeine Intake - occasional  Working - no   Marital status - single  Living with - mom  Allergies   Allergen Reactions    Haldol [Haloperidol] Other     Muscle spasms     Family History   Problem Relation Name Age of Onset    Gallbladder disease Mother      Gallbladder disease Sister       Past Medical History:   Diagnosis Date    Pancreatitis (HHS-HCC)     Sleep apnea          Review of Systems    Objective   Physical Exam  Laparoscopic incisions clean dry and intact.  VIN site clean and dry.  Output minimal.  No evidence of bile.  VIN drain pulled and dry dressing applied.  Assessment/Plan   Problem List Items Addressed This Visit             ICD-10-CM    Calculus of gallbladder with chronic cholecystitis without obstruction - Primary K80.10     Status post lap for scopic cholecystectomy.  Incisions healed nicely.  VIN drain pulled.  Patient to still do no lifting over 15 pounds until 3 weeks out from surgery.  Patient to follow-up as needed.  Patient had question of very small filling defect near the distal common bile duct on MRCP.  His LFTs came down very nicely.  An attempt was made to shoot intraoperative cholangiograms however there was so much inflammation I was concerned about the integrity of the cystic duct when doing the dissection therefore the cholangiogram was abandoned.  LFTs were repeated while he was still in the hospital and came down very nicely.  Patient is currently asymptomatic today.  He does however have a follow-up with GI for  discussion of possible ERCP for the questionable filling defects in the distal common bile duct.  This may be managed conservatively with repeat LFTs versus repeat MRCP versus ERCP.                 Jessica Wheeler MD 05/02/24 10:44 AM

## 2024-05-01 NOTE — PROGRESS NOTES
"  Jhonatan Huffman is a 28 y.o. male with past medical history of schizophrenia who presents today for new patient visit after cholecystectomy. Patient was admitted to hospital 4/21/2024 due to gallstone pancreatitis. MRCP 4/22/2024 showed nearly resolved pancreatitis. Cholelithiasis and possible nonobstructing distal CBD/sludge without biliary obstruction or evidence of acute cholecystitis. He underwent laparoscopic cholecystectomy with Dr. Wheeler but was unable to do IOC due to inflammation. Surgery went well but he was kept an additional day due to leukocytosis postop. Was given antibiotics and discharged. LFTs have been trending down.    Since returning home he has been doing well. He denies pain but reports some RUQ \"pressure\". Does not hurt after eating. He had one episode of nausea after over-eating the other day otherwise no nausea. Having diarrhea 2-3 times per day. No fevers or vomiting. No difficulty eating. He has lost about 10 pounds.    Family history: Gallbladder issues in the family. Aunt with ulcerative colitis. Denies family history of colon cancer or other GI disorders or malignancy.     Past Medical History:   Diagnosis Date    Pancreatitis ([facility]CC)     Sleep apnea      Past Surgical History:   Procedure Laterality Date    CHOLECYSTECTOMY N/A 04/24/2024    Dr. Wheeler     Current Outpatient Medications   Medication Sig Dispense Refill    Abilify Asimtufii 960 mg/3.2 mL suspension,extended rel syring Inject 960 mg into the muscle every 8 (eight) weeks      albuterol 90 mcg/actuation inhaler INHALE TWO PUFFS INTO THE LUNGS FOUR TIMES DAILY AS NEEDED      FLUoxetine (PROzac) 20 mg capsule Take 1 capsule (20 mg) by mouth once daily at bedtime.      nicotine (Nicoderm CQ) 14 mg/24 hr patch Place 1 patch over 24 hours on the skin once daily. 14 patch 0    nicotine (Nicoderm CQ) 21 mg/24 hr patch PLACE 1 PATCH ONTO SKIN ONCE DAILY      nicotine (Nicoderm CQ) 7 mg/24 hr patch PLACE 1 PATCH ONTO SKIN " "EVERY 24 HOURS      nicotine polacrilex (Nicorette) 2 mg gum        No current facility-administered medications for this visit.     Allergies   Allergen Reactions    Haldol [Haloperidol] Other     Muscle spasms       Family History   Problem Relation Name Age of Onset    Gallbladder disease Mother      Gallbladder disease Sister         Review of Systems  Review of Systems negative except as noted in HPI.    Objective     /76   Pulse 90   Temp 36.9 °C (98.4 °F) (Temporal)   Ht 1.702 m (5' 7\")   Wt 110 kg (242 lb 3.2 oz)   BMI 37.93 kg/m²      Physical Exam  Constitutional:  No acute distress. Normal appearance. Not ill-appearing.  HENT:  Head normocephalic and atraumatic. Conjunctivae normal.  Cardiovascular:  Normal rate. Regular rhythm.  Pulmonary:  Pulmonary effort normal. No respiratory distress. Breath sounds clear.  Abdominal:  Abdomen is flat and soft. There is no distension. No tenderness or guarding.  Skin: Dry.  Neurological:  Alert and oriented.  Psychiatric:  Mood and affect normal.    Assessment/Plan     28 y.o. male with history of schizophrenia who presents today for initial clinic visit after recent hospitalization for gallstone pancreatitis s/p lap cholecystectomy. Procedure went well however was unable to do IOC due to inflammation. He is overall doing well although he notes some RUQ pressure that is very mild as well as some postprandial diarrhea. LFTs have been trending down. We will repeat. We also discussed diet.    Recommendations  Obtain repeat labs.  Schedule ERCP with Dr. Fernandez as during lap arabella was unable to do IOC due to inflammation.  Follow up as needed. Notify office of any new or worsening symptoms.    Electronically signed by: Leila Diez CNP on 5/9/2024 at 11:24 AM      "

## 2024-05-02 ENCOUNTER — OFFICE VISIT (OUTPATIENT)
Dept: SURGERY | Facility: CLINIC | Age: 29
End: 2024-05-02
Payer: COMMERCIAL

## 2024-05-02 VITALS
DIASTOLIC BLOOD PRESSURE: 83 MMHG | WEIGHT: 254.1 LBS | SYSTOLIC BLOOD PRESSURE: 133 MMHG | HEIGHT: 67 IN | BODY MASS INDEX: 39.88 KG/M2 | HEART RATE: 91 BPM | OXYGEN SATURATION: 97 % | TEMPERATURE: 99.2 F

## 2024-05-02 DIAGNOSIS — K80.10 CALCULUS OF GALLBLADDER WITH CHRONIC CHOLECYSTITIS WITHOUT OBSTRUCTION: Primary | ICD-10-CM

## 2024-05-02 PROCEDURE — 99024 POSTOP FOLLOW-UP VISIT: CPT | Performed by: SURGERY

## 2024-05-02 ASSESSMENT — PATIENT HEALTH QUESTIONNAIRE - PHQ9
SUM OF ALL RESPONSES TO PHQ9 QUESTIONS 1 AND 2: 0
1. LITTLE INTEREST OR PLEASURE IN DOING THINGS: NOT AT ALL
2. FEELING DOWN, DEPRESSED OR HOPELESS: NOT AT ALL

## 2024-05-02 ASSESSMENT — PAIN SCALES - GENERAL: PAINLEVEL: 2

## 2024-05-09 ENCOUNTER — LAB (OUTPATIENT)
Dept: LAB | Facility: LAB | Age: 29
End: 2024-05-09
Payer: COMMERCIAL

## 2024-05-09 ENCOUNTER — OFFICE VISIT (OUTPATIENT)
Dept: GASTROENTEROLOGY | Facility: CLINIC | Age: 29
End: 2024-05-09
Payer: COMMERCIAL

## 2024-05-09 VITALS
DIASTOLIC BLOOD PRESSURE: 76 MMHG | BODY MASS INDEX: 38.01 KG/M2 | WEIGHT: 242.2 LBS | HEIGHT: 67 IN | SYSTOLIC BLOOD PRESSURE: 116 MMHG | HEART RATE: 90 BPM | TEMPERATURE: 98.4 F

## 2024-05-09 DIAGNOSIS — K85.10 GALLSTONE PANCREATITIS (HHS-HCC): Primary | ICD-10-CM

## 2024-05-09 DIAGNOSIS — R10.11 RUQ PAIN: ICD-10-CM

## 2024-05-09 DIAGNOSIS — R79.89 ELEVATED LFTS: ICD-10-CM

## 2024-05-09 DIAGNOSIS — K85.10 GALLSTONE PANCREATITIS (HHS-HCC): ICD-10-CM

## 2024-05-09 LAB
ALBUMIN SERPL BCP-MCNC: 4.2 G/DL (ref 3.4–5)
ALP SERPL-CCNC: 74 U/L (ref 33–120)
ALT SERPL W P-5'-P-CCNC: 33 U/L (ref 10–52)
ANION GAP SERPL CALC-SCNC: 16 MMOL/L (ref 10–20)
AST SERPL W P-5'-P-CCNC: 25 U/L (ref 9–39)
BILIRUB SERPL-MCNC: 0.3 MG/DL (ref 0–1.2)
BUN SERPL-MCNC: 10 MG/DL (ref 6–23)
CALCIUM SERPL-MCNC: 9.4 MG/DL (ref 8.6–10.6)
CHLORIDE SERPL-SCNC: 104 MMOL/L (ref 98–107)
CO2 SERPL-SCNC: 25 MMOL/L (ref 21–32)
CREAT SERPL-MCNC: 0.69 MG/DL (ref 0.5–1.3)
EGFRCR SERPLBLD CKD-EPI 2021: >90 ML/MIN/1.73M*2
ERYTHROCYTE [DISTWIDTH] IN BLOOD BY AUTOMATED COUNT: 12.8 % (ref 11.5–14.5)
GLUCOSE SERPL-MCNC: 82 MG/DL (ref 74–99)
HCT VFR BLD AUTO: 44.2 % (ref 41–52)
HGB BLD-MCNC: 13.8 G/DL (ref 13.5–17.5)
MCH RBC QN AUTO: 25.2 PG (ref 26–34)
MCHC RBC AUTO-ENTMCNC: 31.2 G/DL (ref 32–36)
MCV RBC AUTO: 81 FL (ref 80–100)
NRBC BLD-RTO: 0 /100 WBCS (ref 0–0)
PLATELET # BLD AUTO: 613 X10*3/UL (ref 150–450)
POTASSIUM SERPL-SCNC: 4.6 MMOL/L (ref 3.5–5.3)
PROT SERPL-MCNC: 7.6 G/DL (ref 6.4–8.2)
RBC # BLD AUTO: 5.47 X10*6/UL (ref 4.5–5.9)
SODIUM SERPL-SCNC: 140 MMOL/L (ref 136–145)
WBC # BLD AUTO: 12.8 X10*3/UL (ref 4.4–11.3)

## 2024-05-09 PROCEDURE — 85027 COMPLETE CBC AUTOMATED: CPT

## 2024-05-09 PROCEDURE — 99214 OFFICE O/P EST MOD 30 MIN: CPT | Performed by: NURSE PRACTITIONER

## 2024-05-09 PROCEDURE — 99204 OFFICE O/P NEW MOD 45 MIN: CPT | Performed by: NURSE PRACTITIONER

## 2024-05-09 PROCEDURE — 36415 COLL VENOUS BLD VENIPUNCTURE: CPT

## 2024-05-09 PROCEDURE — 80053 COMPREHEN METABOLIC PANEL: CPT

## 2024-05-09 RX ORDER — NICOTINE 7MG/24HR
PATCH, TRANSDERMAL 24 HOURS TRANSDERMAL
COMMUNITY
Start: 2024-01-30

## 2024-05-09 RX ORDER — IBUPROFEN 200 MG
TABLET ORAL
COMMUNITY
Start: 2023-12-11

## 2024-05-09 RX ORDER — MICONAZOLE NITRATE 2 %
CREAM (GRAM) TOPICAL
COMMUNITY
Start: 2022-07-29

## 2024-05-09 RX ORDER — ALBUTEROL SULFATE 90 UG/1
AEROSOL, METERED RESPIRATORY (INHALATION)
COMMUNITY
Start: 2024-01-02

## 2024-05-09 ASSESSMENT — PAIN SCALES - GENERAL: PAINLEVEL: 0-NO PAIN

## 2024-05-09 NOTE — PATIENT INSTRUCTIONS
Recommendations  Obtain repeat labs.  Follow up as needed. Please call the office at [phone] with any questions or concerns.

## 2024-07-17 ENCOUNTER — APPOINTMENT (OUTPATIENT)
Dept: SLEEP MEDICINE | Facility: CLINIC | Age: 29
End: 2024-07-17
Payer: COMMERCIAL

## 2024-08-01 ENCOUNTER — HOSPITAL ENCOUNTER (OUTPATIENT)
Dept: GASTROENTEROLOGY | Facility: HOSPITAL | Age: 29
Setting detail: OUTPATIENT SURGERY
Discharge: HOME | End: 2024-08-01
Payer: COMMERCIAL

## 2024-08-01 ENCOUNTER — ANESTHESIA (OUTPATIENT)
Dept: GASTROENTEROLOGY | Facility: HOSPITAL | Age: 29
End: 2024-08-01
Payer: COMMERCIAL

## 2024-08-01 ENCOUNTER — HOSPITAL ENCOUNTER (OUTPATIENT)
Dept: RADIOLOGY | Facility: HOSPITAL | Age: 29
Setting detail: OUTPATIENT SURGERY
Discharge: HOME | End: 2024-08-01
Payer: COMMERCIAL

## 2024-08-01 ENCOUNTER — ANESTHESIA EVENT (OUTPATIENT)
Dept: GASTROENTEROLOGY | Facility: HOSPITAL | Age: 29
End: 2024-08-01
Payer: COMMERCIAL

## 2024-08-01 VITALS
HEIGHT: 67 IN | WEIGHT: 220.46 LBS | RESPIRATION RATE: 13 BRPM | HEART RATE: 68 BPM | OXYGEN SATURATION: 94 % | TEMPERATURE: 97.5 F | BODY MASS INDEX: 34.6 KG/M2 | DIASTOLIC BLOOD PRESSURE: 73 MMHG | SYSTOLIC BLOOD PRESSURE: 130 MMHG

## 2024-08-01 DIAGNOSIS — R10.11 RUQ PAIN: ICD-10-CM

## 2024-08-01 DIAGNOSIS — R79.89 ELEVATED LFTS: ICD-10-CM

## 2024-08-01 DIAGNOSIS — K85.10 GALLSTONE PANCREATITIS (HHS-HCC): ICD-10-CM

## 2024-08-01 DIAGNOSIS — K83.1 OBSTRUCTION OF BILE DUCT (CMS-HCC): ICD-10-CM

## 2024-08-01 PROCEDURE — A43262 PR ERCP,SPHINCTEROTOMY: Performed by: ANESTHESIOLOGIST ASSISTANT

## 2024-08-01 PROCEDURE — A43262 PR ERCP,SPHINCTEROTOMY: Performed by: ANESTHESIOLOGY

## 2024-08-01 PROCEDURE — 2550000001 HC RX 255 CONTRASTS: Performed by: INTERNAL MEDICINE

## 2024-08-01 PROCEDURE — C1769 GUIDE WIRE: HCPCS

## 2024-08-01 PROCEDURE — 7100000009 HC PHASE TWO TIME - INITIAL BASE CHARGE

## 2024-08-01 PROCEDURE — 2720000007 HC OR 272 NO HCPCS

## 2024-08-01 PROCEDURE — 2500000005 HC RX 250 GENERAL PHARMACY W/O HCPCS: Performed by: ANESTHESIOLOGY

## 2024-08-01 PROCEDURE — 2500000004 HC RX 250 GENERAL PHARMACY W/ HCPCS (ALT 636 FOR OP/ED): Performed by: ANESTHESIOLOGY

## 2024-08-01 PROCEDURE — 2500000004 HC RX 250 GENERAL PHARMACY W/ HCPCS (ALT 636 FOR OP/ED): Performed by: NURSE ANESTHETIST, CERTIFIED REGISTERED

## 2024-08-01 PROCEDURE — 7100000001 HC RECOVERY ROOM TIME - INITIAL BASE CHARGE

## 2024-08-01 PROCEDURE — 43262 ENDO CHOLANGIOPANCREATOGRAPH: CPT | Performed by: STUDENT IN AN ORGANIZED HEALTH CARE EDUCATION/TRAINING PROGRAM

## 2024-08-01 PROCEDURE — 2500000005 HC RX 250 GENERAL PHARMACY W/O HCPCS: Performed by: INTERNAL MEDICINE

## 2024-08-01 PROCEDURE — 43262 ENDO CHOLANGIOPANCREATOGRAPH: CPT | Performed by: INTERNAL MEDICINE

## 2024-08-01 PROCEDURE — 7100000002 HC RECOVERY ROOM TIME - EACH INCREMENTAL 1 MINUTE

## 2024-08-01 PROCEDURE — 3700000002 HC GENERAL ANESTHESIA TIME - EACH INCREMENTAL 1 MINUTE

## 2024-08-01 PROCEDURE — 2500000005 HC RX 250 GENERAL PHARMACY W/O HCPCS: Performed by: NURSE ANESTHETIST, CERTIFIED REGISTERED

## 2024-08-01 PROCEDURE — 3700000001 HC GENERAL ANESTHESIA TIME - INITIAL BASE CHARGE

## 2024-08-01 PROCEDURE — 7100000010 HC PHASE TWO TIME - EACH INCREMENTAL 1 MINUTE

## 2024-08-01 RX ORDER — LIDOCAINE HYDROCHLORIDE 20 MG/ML
INJECTION, SOLUTION INFILTRATION; PERINEURAL AS NEEDED
Status: DISCONTINUED | OUTPATIENT
Start: 2024-08-01 | End: 2024-08-01

## 2024-08-01 RX ORDER — SUCCINYLCHOLINE CHLORIDE 20 MG/ML
INJECTION INTRAMUSCULAR; INTRAVENOUS AS NEEDED
Status: DISCONTINUED | OUTPATIENT
Start: 2024-08-01 | End: 2024-08-01

## 2024-08-01 RX ORDER — MIDAZOLAM HYDROCHLORIDE 1 MG/ML
INJECTION, SOLUTION INTRAMUSCULAR; INTRAVENOUS AS NEEDED
Status: DISCONTINUED | OUTPATIENT
Start: 2024-08-01 | End: 2024-08-01

## 2024-08-01 RX ORDER — VARENICLINE TARTRATE 1 MG/1
1 TABLET, FILM COATED ORAL 2 TIMES DAILY
COMMUNITY

## 2024-08-01 RX ORDER — INDOMETHACIN 50 MG/1
SUPPOSITORY RECTAL AS NEEDED
Status: COMPLETED | OUTPATIENT
Start: 2024-08-01 | End: 2024-08-01

## 2024-08-01 RX ORDER — SODIUM CHLORIDE, SODIUM LACTATE, POTASSIUM CHLORIDE, CALCIUM CHLORIDE 600; 310; 30; 20 MG/100ML; MG/100ML; MG/100ML; MG/100ML
20 INJECTION, SOLUTION INTRAVENOUS CONTINUOUS
Status: DISCONTINUED | OUTPATIENT
Start: 2024-08-01 | End: 2024-08-02 | Stop reason: HOSPADM

## 2024-08-01 RX ORDER — PROMETHAZINE HYDROCHLORIDE 25 MG/ML
6.25 INJECTION, SOLUTION INTRAMUSCULAR; INTRAVENOUS ONCE AS NEEDED
OUTPATIENT
Start: 2024-08-01

## 2024-08-01 RX ORDER — ONDANSETRON HYDROCHLORIDE 2 MG/ML
INJECTION, SOLUTION INTRAVENOUS AS NEEDED
Status: DISCONTINUED | OUTPATIENT
Start: 2024-08-01 | End: 2024-08-01

## 2024-08-01 RX ORDER — SODIUM CHLORIDE, SODIUM LACTATE, POTASSIUM CHLORIDE, CALCIUM CHLORIDE 600; 310; 30; 20 MG/100ML; MG/100ML; MG/100ML; MG/100ML
100 INJECTION, SOLUTION INTRAVENOUS CONTINUOUS
Status: DISCONTINUED | OUTPATIENT
Start: 2024-08-01 | End: 2024-08-02 | Stop reason: HOSPADM

## 2024-08-01 RX ORDER — OXYCODONE HYDROCHLORIDE 5 MG/1
5 TABLET ORAL EVERY 4 HOURS PRN
Status: DISCONTINUED | OUTPATIENT
Start: 2024-08-01 | End: 2024-08-02 | Stop reason: HOSPADM

## 2024-08-01 RX ORDER — LIDOCAINE HYDROCHLORIDE 10 MG/ML
0.1 INJECTION, SOLUTION EPIDURAL; INFILTRATION; INTRACAUDAL; PERINEURAL ONCE
Status: DISCONTINUED | OUTPATIENT
Start: 2024-08-01 | End: 2024-08-02 | Stop reason: HOSPADM

## 2024-08-01 RX ORDER — FENTANYL CITRATE 50 UG/ML
INJECTION, SOLUTION INTRAMUSCULAR; INTRAVENOUS AS NEEDED
Status: DISCONTINUED | OUTPATIENT
Start: 2024-08-01 | End: 2024-08-01

## 2024-08-01 RX ORDER — PROPOFOL 10 MG/ML
INJECTION, EMULSION INTRAVENOUS AS NEEDED
Status: DISCONTINUED | OUTPATIENT
Start: 2024-08-01 | End: 2024-08-01

## 2024-08-01 RX ORDER — ACETAMINOPHEN 325 MG/1
650 TABLET ORAL EVERY 4 HOURS PRN
Status: DISCONTINUED | OUTPATIENT
Start: 2024-08-01 | End: 2024-08-02 | Stop reason: HOSPADM

## 2024-08-01 SDOH — HEALTH STABILITY: MENTAL HEALTH: CURRENT SMOKER: 0

## 2024-08-01 ASSESSMENT — PAIN - FUNCTIONAL ASSESSMENT
PAIN_FUNCTIONAL_ASSESSMENT: 0-10

## 2024-08-01 ASSESSMENT — PAIN SCALES - GENERAL
PAINLEVEL_OUTOF10: 0 - NO PAIN

## 2024-08-01 NOTE — ANESTHESIA PROCEDURE NOTES
Airway  Date/Time: 8/1/2024 12:05 PM  Urgency: elective    Airway not difficult    Staffing  Performed: CRNA   Authorized by: Jaime España MD    Performed by: AMIRA Miller-JAMIE  Patient location during procedure: OR    Indications and Patient Condition  Indications for airway management: anesthesia  Spontaneous ventilation: present  Sedation level: deep  Preoxygenated: yes  Patient position: sniffing  Mask difficulty assessment: 2 - vent by mask + OA or adjuvant +/- NMBA    Final Airway Details  Final airway type: endotracheal airway      Successful airway: ETT  Cuffed: yes   Successful intubation technique: video laryngoscopy  Facilitating devices/methods: intubating stylet  Endotracheal tube insertion site: oral  Blade size: #4  ETT size (mm): 7.5  Cormack-Lehane Classification: grade I - full view of glottis  Placement verified by: chest auscultation and capnometry   Measured from: teeth  ETT to teeth (cm): 23  Number of attempts at approach: 1

## 2024-08-01 NOTE — H&P
"History Of Present Illness  Jhonatan Huffman is a 28 y.o. male presenting for elective ERCP. Patient underwent laparoscopic cholecystectomy for acute cholecystectomy on 4/24/24. IOC was attempted, but unable to be performed due to severe inflammation. Patient originally had loose stools and ongoing vague R?UQ discomfort immediately post-op, but is feeling well at this time. Bowel habits have normalized. Tolerating diet. Denies f/c, recent trips to ED, new medications, other new symptoms.       Past Medical History  Past Medical History:   Diagnosis Date    Gallstones     History of tobacco use disorder     Pancreatitis ([facility])     Paranoid schizophrenia (Multi)     Sleep apnea        Surgical History  Past Surgical History:   Procedure Laterality Date    CHOLECYSTECTOMY N/A 04/24/2024    Dr. Wheeler    WISDOM TOOTH EXTRACTION          Social History  He reports that he has been smoking cigarettes. He has never used smokeless tobacco. He reports that he does not currently use alcohol. He reports that he does not currently use drugs after having used the following drugs: Cocaine, Marijuana, LSD, Oxycodone, and Psilocybin.    Family History  Family History   Problem Relation Name Age of Onset    Gallbladder disease Mother      Gallbladder disease Sister          Allergies  Haldol [haloperidol]    Review of Systems as per hpi, otherwise negative     Physical Exam  NAD  Sclera anicteric  Not jaundiced  Nontachycardic  Nonlabored on RA  Abd soft, nondistended  No edema     Last Recorded Vitals  Blood pressure 143/75, pulse 78, temperature 36.5 °C (97.7 °F), temperature source Temporal, resp. rate 15, height 1.702 m (5' 7\"), weight 100 kg (220 lb 7.4 oz), SpO2 94%.    Relevant Results  CBC and CMP from 5/29/2024 reviewed; normal bilirubin    Pre-op MRCP (4/22/24) with possible choledocholithiasis without signs of obstruction      Assessment/Plan   Active Problems:  There are no active Hospital Problems.      - Plan for " post-cholecystectomy ERCP to ensure CBD is clear      JJ Cynthia

## 2024-08-01 NOTE — DISCHARGE INSTRUCTIONS

## 2024-08-01 NOTE — NURSING NOTE
1345 assuming care of pt at this time, family bedside. Dr. Fernandez messaged on pt status.   1400 pt and family made aware of Dr. Fernandez coming to bedside.   1415 pt assisted with dressing with help of this RN, IV removed dressing applied.   1420 Dr. Fernandez bedside at this time.   1425 Discharge instructions provided to pt and family. Educated on medications, effects of anesthesia, and homecoming care. Pt and family verbalizing understanding of all instructions provided at this time. All questions and concerns answered. Pt given contact information for provider.  1428 Pt assisted to main lobby via  by transport. Dc in stable condition to home. All belongings taken with pt.

## 2024-08-01 NOTE — ANESTHESIA PREPROCEDURE EVALUATION
Patient: Jhonatan Huffman    Procedure Information       Date/Time: 08/01/24 1120    Scheduled providers: Jovany Fernandez DO; Jaime España MD; BUZZ Miller    Procedure: ERCP    Location: [facility]            Relevant Problems   Pulmonary   (+) Complex sleep apnea syndrome      Neuro   (+) Paranoid schizophrenia (Multi)      Liver   (+) Calculus of gallbladder with chronic cholecystitis without obstruction   (+) Gallstone pancreatitis ([facility]-HCC)       Clinical information reviewed:   Tobacco  Allergies  Meds   Med Hx  Surg Hx   Fam Hx  Soc Hx         Past Medical History:   Diagnosis Date    Gallstones     History of tobacco use disorder     Pancreatitis ([facility]-HCC)     Paranoid schizophrenia (Multi)     Sleep apnea       Past Surgical History:   Procedure Laterality Date    CHOLECYSTECTOMY N/A 04/24/2024    Dr. Wheeler    WISDOM TOOTH EXTRACTION       Social History     Tobacco Use    Smoking status: Every Day     Current packs/day: 0.50     Types: Cigarettes    Smokeless tobacco: Never   Vaping Use    Vaping status: Never Used   Substance Use Topics    Alcohol use: Not Currently    Drug use: Not Currently     Types: Cocaine, Marijuana, LSD, Oxycodone, Psilocybin      Current Outpatient Medications   Medication Instructions    Abilify Asimtufii 960 mg/3.2 mL suspension,extended rel syring Inject 960 mg into the muscle every 8 (eight) weeks    albuterol 90 mcg/actuation inhaler INHALE TWO PUFFS INTO THE LUNGS FOUR TIMES DAILY AS NEEDED    FLUoxetine (PROZAC) 20 mg, oral, Nightly    nicotine (Nicoderm CQ) 14 mg/24 hr patch 1 patch, transdermal, Daily    nicotine (Nicoderm CQ) 21 mg/24 hr patch PLACE 1 PATCH ONTO SKIN ONCE DAILY    nicotine (Nicoderm CQ) 7 mg/24 hr patch PLACE 1 PATCH ONTO SKIN EVERY 24 HOURS    nicotine polacrilex (Nicorette) 2 mg gum     varenicline (CHANTIX) 1 mg, oral, 2 times daily, Take with full glass of water.      Allergies   Allergen Reactions    Haldol  "[Haloperidol] Other     Muscle spasms        Chemistry    Lab Results   Component Value Date/Time     05/09/2024 1141    K 4.6 05/09/2024 1141     05/09/2024 1141    CO2 25 05/09/2024 1141    BUN 10 05/09/2024 1141    CREATININE 0.69 05/09/2024 1141    Lab Results   Component Value Date/Time    CALCIUM 9.4 05/09/2024 1141    ALKPHOS 74 05/09/2024 1141    AST 25 05/09/2024 1141    ALT 33 05/09/2024 1141    BILITOT 0.3 05/09/2024 1141          No results found for: \"HGBA1C\"  Lab Results   Component Value Date/Time    WBC 12.8 (H) 05/09/2024 1141    HGB 13.8 05/09/2024 1141    HCT 44.2 05/09/2024 1141     (H) 05/09/2024 1141     No results found for: \"PROTIME\", \"PTT\", \"INR\"  No results found for: \"ABORH\"  No results found for this or any previous visit (from the past 4464 hour(s)).  No results found for this or any previous visit from the past 1095 days.       Visit Vitals  /75   Pulse 78   Temp 36.5 °C (97.7 °F) (Temporal)   Resp 15   Ht 1.702 m (5' 7\")   Wt 100 kg (220 lb 7.4 oz)   SpO2 94%   BMI 34.53 kg/m²   Smoking Status Every Day   BSA 2.17 m²     NPO/Void Status  Carbohydrate Drink Given Prior to Surgery? : N  Date of Last Liquid: 08/01/24  Time of Last Liquid: 0400  Date of Last Solid: 07/31/24  Time of Last Solid: 2200  Last Intake Type: Clear fluids  Time of Last Void: 0945         Physical Exam    Airway  Mallampati: II  TM distance: >3 FB  Neck ROM: full     Cardiovascular - normal exam     Dental - normal exam     Pulmonary - normal exam     Abdominal - normal exam              Anesthesia Plan    History of general anesthesia?: yes  History of complications of general anesthesia?: no    ASA 2     general     The patient is not a current smoker.    intravenous induction   Anesthetic plan and risks discussed with patient.    Plan discussed with CRNA.        "

## 2024-08-01 NOTE — ANESTHESIA POSTPROCEDURE EVALUATION
Patient: Jhonatan Huffman    Procedure Summary       Date: 08/01/24 Room / Location: [facility] [place]    Anesthesia Start: 1152 Anesthesia Stop: 1251    Procedure: ERCP Diagnosis:       Gallstone pancreatitis ([facility])      Elevated LFTs      RUQ pain    Scheduled Providers: Jovany Fernandez DO; Jaime España MD; AMIRA Miller-CRNA Responsible Provider: Jaime España MD    Anesthesia Type: general ASA Status: 2            Anesthesia Type: general    Vitals Value Taken Time   /59 08/01/24 1302   Temp 36.6 °C (97.9 °F) 08/01/24 1245   Pulse 74 08/01/24 1306   Resp 14 08/01/24 1245   SpO2 97 % 08/01/24 1306   Vitals shown include unfiled device data.    Anesthesia Post Evaluation    Patient location during evaluation: PACU  Patient participation: complete - patient participated  Level of consciousness: awake  Pain management: adequate  Airway patency: patent  Cardiovascular status: acceptable  Respiratory status: acceptable  Hydration status: acceptable  Postoperative Nausea and Vomiting: none        No notable events documented.

## 2024-08-02 ASSESSMENT — PAIN SCALES - GENERAL: PAINLEVEL_OUTOF10: 0 - NO PAIN

## 2024-09-11 ENCOUNTER — APPOINTMENT (OUTPATIENT)
Dept: SLEEP MEDICINE | Facility: CLINIC | Age: 29
End: 2024-09-11
Payer: COMMERCIAL

## 2024-09-11 VITALS
OXYGEN SATURATION: 98 % | WEIGHT: 246 LBS | DIASTOLIC BLOOD PRESSURE: 74 MMHG | HEART RATE: 84 BPM | SYSTOLIC BLOOD PRESSURE: 130 MMHG | BODY MASS INDEX: 38.61 KG/M2 | HEIGHT: 67 IN

## 2024-09-11 DIAGNOSIS — G47.33 OBSTRUCTIVE SLEEP APNEA (ADULT) (PEDIATRIC): Primary | ICD-10-CM

## 2024-09-11 PROBLEM — G47.31 COMPLEX SLEEP APNEA SYNDROME: Status: RESOLVED | Noted: 2024-02-07 | Resolved: 2024-09-11

## 2024-09-11 PROBLEM — G47.39 COMPLEX SLEEP APNEA SYNDROME: Status: RESOLVED | Noted: 2024-02-07 | Resolved: 2024-09-11

## 2024-09-11 PROCEDURE — 3008F BODY MASS INDEX DOCD: CPT | Performed by: INTERNAL MEDICINE

## 2024-09-11 PROCEDURE — 99214 OFFICE O/P EST MOD 30 MIN: CPT | Performed by: INTERNAL MEDICINE

## 2024-09-11 ASSESSMENT — SLEEP AND FATIGUE QUESTIONNAIRES
SITING INACTIVE IN A PUBLIC PLACE LIKE A CLASS ROOM OR A MOVIE THEATER: WOULD NEVER DOZE
ESS-CHAD TOTAL SCORE: 5
HOW LIKELY ARE YOU TO NOD OFF OR FALL ASLEEP WHILE SITTING AND TALKING TO SOMEONE: WOULD NEVER DOZE
HOW LIKELY ARE YOU TO NOD OFF OR FALL ASLEEP IN A CAR, WHILE STOPPED FOR A FEW MINUTES IN TRAFFIC: WOULD NEVER DOZE
HOW LIKELY ARE YOU TO NOD OFF OR FALL ASLEEP WHILE SITTING AND READING: MODERATE CHANCE OF DOZING
HOW LIKELY ARE YOU TO NOD OFF OR FALL ASLEEP WHILE SITTING QUIETLY AFTER LUNCH WITHOUT ALCOHOL: WOULD NEVER DOZE
HOW LIKELY ARE YOU TO NOD OFF OR FALL ASLEEP WHILE LYING DOWN TO REST IN THE AFTERNOON WHEN CIRCUMSTANCES PERMIT: MODERATE CHANCE OF DOZING
HOW LIKELY ARE YOU TO NOD OFF OR FALL ASLEEP WHEN YOU ARE A PASSENGER IN A CAR FOR AN HOUR WITHOUT A BREAK: SLIGHT CHANCE OF DOZING
HOW LIKELY ARE YOU TO NOD OFF OR FALL ASLEEP WHILE WATCHING TV: WOULD NEVER DOZE

## 2024-09-11 ASSESSMENT — PAIN SCALES - GENERAL: PAINLEVEL: 0-NO PAIN

## 2024-09-11 NOTE — ASSESSMENT & PLAN NOTE
Complex apnea resolved.  - Jhonatan Huffman  has sleep apnea and requires treatment.  - Jhonatan Huffman demonstrates good compliance and benefit from PAP therapy  - Continue Bilevel  PAP 12/6 cmH20   - Order for renewal of PAP supplies placed through [employer]  - Follow-up in 12 months

## 2025-03-29 ENCOUNTER — APPOINTMENT (OUTPATIENT)
Dept: RADIOLOGY | Facility: HOSPITAL | Age: 30
End: 2025-03-29
Payer: COMMERCIAL

## 2025-03-29 ENCOUNTER — HOSPITAL ENCOUNTER (EMERGENCY)
Facility: HOSPITAL | Age: 30
Discharge: HOME | End: 2025-03-29
Attending: STUDENT IN AN ORGANIZED HEALTH CARE EDUCATION/TRAINING PROGRAM
Payer: COMMERCIAL

## 2025-03-29 VITALS
RESPIRATION RATE: 18 BRPM | WEIGHT: 246.91 LBS | BODY MASS INDEX: 38.75 KG/M2 | SYSTOLIC BLOOD PRESSURE: 136 MMHG | HEIGHT: 67 IN | TEMPERATURE: 98.8 F | OXYGEN SATURATION: 96 % | HEART RATE: 98 BPM | DIASTOLIC BLOOD PRESSURE: 76 MMHG

## 2025-03-29 DIAGNOSIS — J02.0 STREP THROAT: Primary | ICD-10-CM

## 2025-03-29 LAB
ALBUMIN SERPL BCP-MCNC: 4.2 G/DL (ref 3.4–5)
ALP SERPL-CCNC: 61 U/L (ref 33–120)
ALT SERPL W P-5'-P-CCNC: 39 U/L (ref 10–52)
ANION GAP SERPL CALCULATED.3IONS-SCNC: 13 MMOL/L (ref 10–20)
AST SERPL W P-5'-P-CCNC: 24 U/L (ref 9–39)
BASOPHILS # BLD AUTO: 0.1 X10*3/UL (ref 0–0.1)
BASOPHILS NFR BLD AUTO: 0.4 %
BILIRUB SERPL-MCNC: 0.6 MG/DL (ref 0–1.2)
BUN SERPL-MCNC: 12 MG/DL (ref 6–23)
CALCIUM SERPL-MCNC: 9 MG/DL (ref 8.6–10.3)
CHLORIDE SERPL-SCNC: 104 MMOL/L (ref 98–107)
CO2 SERPL-SCNC: 22 MMOL/L (ref 21–32)
CREAT SERPL-MCNC: 0.85 MG/DL (ref 0.5–1.3)
EGFRCR SERPLBLD CKD-EPI 2021: >90 ML/MIN/1.73M*2
EOSINOPHIL # BLD AUTO: 0.31 X10*3/UL (ref 0–0.7)
EOSINOPHIL NFR BLD AUTO: 1.1 %
ERYTHROCYTE [DISTWIDTH] IN BLOOD BY AUTOMATED COUNT: 13 % (ref 11.5–14.5)
FLUAV RNA RESP QL NAA+PROBE: NOT DETECTED
FLUBV RNA RESP QL NAA+PROBE: NOT DETECTED
GLUCOSE SERPL-MCNC: 107 MG/DL (ref 74–99)
HCT VFR BLD AUTO: 43.2 % (ref 41–52)
HGB BLD-MCNC: 14.1 G/DL (ref 13.5–17.5)
IMM GRANULOCYTES # BLD AUTO: 0.2 X10*3/UL (ref 0–0.7)
IMM GRANULOCYTES NFR BLD AUTO: 0.7 % (ref 0–0.9)
LACTATE SERPL-SCNC: 0.8 MMOL/L (ref 0.4–2)
LYMPHOCYTES # BLD AUTO: 2.06 X10*3/UL (ref 1.2–4.8)
LYMPHOCYTES NFR BLD AUTO: 7.6 %
MCH RBC QN AUTO: 25.2 PG (ref 26–34)
MCHC RBC AUTO-ENTMCNC: 32.6 G/DL (ref 32–36)
MCV RBC AUTO: 77 FL (ref 80–100)
MONOCYTES # BLD AUTO: 1.84 X10*3/UL (ref 0.1–1)
MONOCYTES NFR BLD AUTO: 6.8 %
NEUTROPHILS # BLD AUTO: 22.74 X10*3/UL (ref 1.2–7.7)
NEUTROPHILS NFR BLD AUTO: 83.4 %
NRBC BLD-RTO: 0 /100 WBCS (ref 0–0)
PLATELET # BLD AUTO: 259 X10*3/UL (ref 150–450)
POTASSIUM SERPL-SCNC: 3.3 MMOL/L (ref 3.5–5.3)
PROT SERPL-MCNC: 7.3 G/DL (ref 6.4–8.2)
RBC # BLD AUTO: 5.59 X10*6/UL (ref 4.5–5.9)
S PYO DNA THROAT QL NAA+PROBE: DETECTED
SARS-COV-2 RNA RESP QL NAA+PROBE: NOT DETECTED
SODIUM SERPL-SCNC: 136 MMOL/L (ref 136–145)
WBC # BLD AUTO: 27.3 X10*3/UL (ref 4.4–11.3)

## 2025-03-29 PROCEDURE — 87636 SARSCOV2 & INF A&B AMP PRB: CPT

## 2025-03-29 PROCEDURE — 83605 ASSAY OF LACTIC ACID: CPT

## 2025-03-29 PROCEDURE — 99285 EMERGENCY DEPT VISIT HI MDM: CPT | Mod: 25 | Performed by: STUDENT IN AN ORGANIZED HEALTH CARE EDUCATION/TRAINING PROGRAM

## 2025-03-29 PROCEDURE — 87651 STREP A DNA AMP PROBE: CPT

## 2025-03-29 PROCEDURE — 87075 CULTR BACTERIA EXCEPT BLOOD: CPT | Mod: TRILAB

## 2025-03-29 PROCEDURE — 70491 CT SOFT TISSUE NECK W/DYE: CPT

## 2025-03-29 PROCEDURE — 71045 X-RAY EXAM CHEST 1 VIEW: CPT | Performed by: RADIOLOGY

## 2025-03-29 PROCEDURE — 96366 THER/PROPH/DIAG IV INF ADDON: CPT

## 2025-03-29 PROCEDURE — 80053 COMPREHEN METABOLIC PANEL: CPT

## 2025-03-29 PROCEDURE — 96375 TX/PRO/DX INJ NEW DRUG ADDON: CPT

## 2025-03-29 PROCEDURE — 96374 THER/PROPH/DIAG INJ IV PUSH: CPT

## 2025-03-29 PROCEDURE — 36415 COLL VENOUS BLD VENIPUNCTURE: CPT

## 2025-03-29 PROCEDURE — 70491 CT SOFT TISSUE NECK W/DYE: CPT | Performed by: RADIOLOGY

## 2025-03-29 PROCEDURE — 96365 THER/PROPH/DIAG IV INF INIT: CPT

## 2025-03-29 PROCEDURE — 85025 COMPLETE CBC W/AUTO DIFF WBC: CPT

## 2025-03-29 PROCEDURE — 2500000004 HC RX 250 GENERAL PHARMACY W/ HCPCS (ALT 636 FOR OP/ED)

## 2025-03-29 PROCEDURE — 96361 HYDRATE IV INFUSION ADD-ON: CPT

## 2025-03-29 PROCEDURE — 94640 AIRWAY INHALATION TREATMENT: CPT | Mod: 59

## 2025-03-29 PROCEDURE — 71045 X-RAY EXAM CHEST 1 VIEW: CPT

## 2025-03-29 PROCEDURE — 2550000001 HC RX 255 CONTRASTS

## 2025-03-29 RX ORDER — AMOXICILLIN 500 MG/1
500 CAPSULE ORAL EVERY 12 HOURS SCHEDULED
Qty: 20 CAPSULE | Refills: 0 | Status: SHIPPED | OUTPATIENT
Start: 2025-03-29 | End: 2025-04-08

## 2025-03-29 RX ORDER — AMOXICILLIN 500 MG/1
500 CAPSULE ORAL EVERY 12 HOURS SCHEDULED
Qty: 20 CAPSULE | Refills: 0 | Status: SHIPPED | OUTPATIENT
Start: 2025-03-29 | End: 2025-03-29

## 2025-03-29 RX ORDER — DEXAMETHASONE SODIUM PHOSPHATE 10 MG/ML
10 INJECTION INTRAMUSCULAR; INTRAVENOUS ONCE
Status: COMPLETED | OUTPATIENT
Start: 2025-03-29 | End: 2025-03-29

## 2025-03-29 RX ORDER — KETOROLAC TROMETHAMINE 30 MG/ML
30 INJECTION, SOLUTION INTRAMUSCULAR; INTRAVENOUS ONCE
Status: COMPLETED | OUTPATIENT
Start: 2025-03-29 | End: 2025-03-29

## 2025-03-29 RX ADMIN — DEXAMETHASONE SODIUM PHOSPHATE 10 MG: 10 INJECTION INTRAMUSCULAR; INTRAVENOUS at 21:02

## 2025-03-29 RX ADMIN — IOHEXOL 75 ML: 350 INJECTION, SOLUTION INTRAVENOUS at 21:15

## 2025-03-29 RX ADMIN — AMPICILLIN SODIUM AND SULBACTAM SODIUM 3 G: 2; 1 INJECTION, POWDER, FOR SOLUTION INTRAMUSCULAR; INTRAVENOUS at 21:02

## 2025-03-29 RX ADMIN — KETOROLAC TROMETHAMINE 30 MG: 30 INJECTION, SOLUTION INTRAMUSCULAR at 21:01

## 2025-03-29 RX ADMIN — SODIUM CHLORIDE 1000 ML: 900 INJECTION, SOLUTION INTRAVENOUS at 20:25

## 2025-03-29 RX ADMIN — RACEPINEPHRINE HYDROCHLORIDE 0.5 ML: 11.25 SOLUTION RESPIRATORY (INHALATION) at 21:02

## 2025-03-29 RX ADMIN — SODIUM CHLORIDE 1000 ML: 900 INJECTION, SOLUTION INTRAVENOUS at 20:58

## 2025-03-29 ASSESSMENT — COLUMBIA-SUICIDE SEVERITY RATING SCALE - C-SSRS
1. IN THE PAST MONTH, HAVE YOU WISHED YOU WERE DEAD OR WISHED YOU COULD GO TO SLEEP AND NOT WAKE UP?: NO
2. HAVE YOU ACTUALLY HAD ANY THOUGHTS OF KILLING YOURSELF?: NO
6. HAVE YOU EVER DONE ANYTHING, STARTED TO DO ANYTHING, OR PREPARED TO DO ANYTHING TO END YOUR LIFE?: NO

## 2025-03-29 ASSESSMENT — PAIN - FUNCTIONAL ASSESSMENT: PAIN_FUNCTIONAL_ASSESSMENT: 0-10

## 2025-03-29 ASSESSMENT — PAIN SCALES - GENERAL: PAINLEVEL_OUTOF10: 0 - NO PAIN

## 2025-03-29 NOTE — Clinical Note
Jhonatan Huffman was seen and treated in our emergency department on 3/29/2025.  He may return to work on 03/31/2025.       If you have any questions or concerns, please don't hesitate to call.      Alicia Glaser MD

## 2025-03-30 LAB
BACTERIA BLD CULT: NORMAL
BACTERIA BLD CULT: NORMAL

## 2025-03-30 NOTE — PROGRESS NOTES
This patient was seen by the advanced practice provider.  I have personally performed a substantive portion of the encounter.  I have seen and examined the patient; agree with the workup, evaluation, MDM, management and diagnosis. The care plan has been discussed.      I personally saw the patient and made/approved the management plan and take responsibility for the patient management.    History:  29 year old male presents with sore throat.  Fever for 2 days.  Muffled voice. Denies difficulty breathing.  Able to swallow liquids and solid food.    Exam:  General Appearance:  No acute distress  HENT: Kissing tonsils with swollen uvula, exudates noted.  Submandibular space without firmness, no elevation of tongue  Respiratory: breathing comfortably on room air  Cardiovascular: appears well perfused  GI: nondistended  MSK: no cyanosis, no deformities, normal range of motion  Neuro:  AAOx3, Nonfocal    MDM:  29 y.o. male presents with sore throat.  I have considered the following conditions during my assessment of the patient's condition:  sore throat, epiglottitis, Tc's angina, mononucleosis, peritonsillar abscess, retropharyngeal abscess, strep pharyngitis, tonsillitis.  CT obtained, concern for airway narrowing but no deeper space abscess.  Leukocytosis noted.  Strep A positive.  Patient treated with pain medication and antibiotics.  Patient is able to tolerate oral intake and is also breathing comfortably on room air, and is able to breathe comfortably in multiple different positions.  At this time I see no indication to admit the patient but carefully discussed return precautions with him regarding diminished oral intake and difficulty with breathing.  Patient vocalized understanding.  ENT referral placed.  Patient discharged in stable condition.     FINAL IMPRESSION      1. Strep throat          DISPOSITION    Discharge 03/29/2025 11:14:38 PM

## 2025-03-30 NOTE — DISCHARGE INSTRUCTIONS
If you notice any difficulty swallowing fluids, or difficulty breathing, or noting that you have to be in specific positions to breath properly, please return for re-evaluation.    Please follow up with ENT for recheck

## 2025-04-03 LAB
BACTERIA BLD CULT: NORMAL
BACTERIA BLD CULT: NORMAL

## 2025-04-03 NOTE — ED PROVIDER NOTES
HPI   Chief Complaint   Patient presents with    Flu Symptoms     Pt states he has a fever for the last 2 days. Pt states he has been sweating and has chills and generally feels like crap. Pt has been taking dayquil, nyquil, tylenol, and ibuprofen. Pt states he took both tylenol and ibuprofen at 1800.       HPI  Patient is a 29-year-old male who presents to ED for sore throat x 2 days.  Patient states he has had generalized fatigue and malaise, myalgias, chills, feels generally unwell.  Patient states he has been taking over-the-counter medication for flulike symptoms.  States it is very difficult to swallow.  Patient is tolerating his own secretions and speaking full sentences.  Denies any other acute complaints today.      Patient History   Past Medical History:   Diagnosis Date    Gallstones     History of tobacco use disorder     Pancreatitis ([facility]-HCC)     Paranoid schizophrenia (Multi)     Sleep apnea      Past Surgical History:   Procedure Laterality Date    CHOLECYSTECTOMY N/A 04/24/2024    Dr. Wheeler    WISDOM TOOTH EXTRACTION       Family History   Problem Relation Name Age of Onset    Gallbladder disease Mother      Gallbladder disease Sister       Social History     Tobacco Use    Smoking status: Every Day     Current packs/day: 0.50     Average packs/day: 0.5 packs/day for 13.0 years (6.5 ttl pk-yrs)     Types: Cigarettes    Smokeless tobacco: Never   Vaping Use    Vaping status: Never Used   Substance Use Topics    Alcohol use: Not Currently    Drug use: Not Currently     Types: Cocaine, Marijuana, LSD, Oxycodone, Psilocybin       Physical Exam   ED Triage Vitals [03/29/25 2007]   Temperature Heart Rate Respirations BP   37.1 °C (98.8 °F) (!) 121 20 149/83      Pulse Ox Temp Source Heart Rate Source Patient Position   97 % Oral Monitor Sitting      BP Location FiO2 (%)     Right arm --       Physical Exam  Vitals reviewed.   Constitutional:       General: He is not in acute distress.     Appearance:  Normal appearance. He is not ill-appearing.   HENT:      Head: Normocephalic and atraumatic.      Mouth/Throat:      Pharynx: Pharyngeal swelling, oropharyngeal exudate, posterior oropharyngeal erythema and uvula swelling present.      Tonsils: Tonsillar exudate present.   Eyes:      Extraocular Movements: Extraocular movements intact.   Cardiovascular:      Rate and Rhythm: Normal rate and regular rhythm.      Heart sounds: Normal heart sounds.   Pulmonary:      Effort: Pulmonary effort is normal.      Breath sounds: Normal breath sounds.   Musculoskeletal:      Cervical back: Normal range of motion and neck supple.   Neurological:      Mental Status: He is alert.           ED Course & MDM   Diagnoses as of 04/03/25 1533   Strep throat                 No data recorded                                 Medical Decision Making  Parts of this chart have been completed using voice recognition software. Please excuse any errors of transcription.  My thought process and reason for plan has been formulated from the time that I saw the patient until the time of disposition and is not specific to one specific moment during their visit and furthermore my MDM encompasses this entire chart and not only this text box.    HPI:   A medically appropriate HPI was obtained, outlined above.    Jhonatan Huffman is a  29 y.o. male    Chief Complaint   Patient presents with    Flu Symptoms     Pt states he has a fever for the last 2 days. Pt states he has been sweating and has chills and generally feels like crap. Pt has been taking dayquil, nyquil, tylenol, and ibuprofen. Pt states he took both tylenol and ibuprofen at 1800.       Past Medical History:   Diagnosis Date    Gallstones     History of tobacco use disorder     Pancreatitis ([facility]-HCC)     Paranoid schizophrenia (Multi)     Sleep apnea        Past Surgical History:   Procedure Laterality Date    CHOLECYSTECTOMY N/A 04/24/2024    Dr. Wheeler    WISDOM TOOTH EXTRACTION          Social History     Tobacco Use    Smoking status: Every Day     Current packs/day: 0.50     Average packs/day: 0.5 packs/day for 13.0 years (6.5 ttl pk-yrs)     Types: Cigarettes    Smokeless tobacco: Never   Vaping Use    Vaping status: Never Used   Substance Use Topics    Alcohol use: Not Currently    Drug use: Not Currently     Types: Cocaine, Marijuana, LSD, Oxycodone, Psilocybin       Family History   Problem Relation Name Age of Onset    Gallbladder disease Mother      Gallbladder disease Sister         Allergies   Allergen Reactions    Haldol [Haloperidol] Other     Muscle spasms       Current Outpatient Medications   Medication Instructions    Abilify Asimtufii 960 mg/3.2 mL suspension,extended rel syring Inject 960 mg into the muscle every 8 (eight) weeks    albuterol 90 mcg/actuation inhaler INHALE TWO PUFFS INTO THE LUNGS FOUR TIMES DAILY AS NEEDED    amoxicillin (AMOXIL) 500 mg, oral, Every 12 hours scheduled    FLUoxetine (PROZAC) 20 mg, oral, Nightly    nicotine (Nicoderm CQ) 14 mg/24 hr patch 1 patch, transdermal, Daily    nicotine (Nicoderm CQ) 21 mg/24 hr patch PLACE 1 PATCH ONTO SKIN ONCE DAILY    nicotine (Nicoderm CQ) 7 mg/24 hr patch PLACE 1 PATCH ONTO SKIN EVERY 24 HOURS    nicotine polacrilex (Nicorette) 2 mg gum     varenicline tartrate (CHANTIX) 1 mg, oral, 2 times daily, Take with full glass of water.   for details    Exam:   No data found.    A medically appropriate exam performed, outlined above, given the known history and presentation.    EKG/Cardiac monitor:   If EKG was done and, it was interpreted by attending physician, see their note for ED course for more detail.    Medications given during visit:  Medications   sodium chloride 0.9 % bolus 1,000 mL (0 mL intravenous Stopped 3/29/25 2057)   sodium chloride 0.9 % bolus 1,000 mL (0 mL intravenous Stopped 3/29/25 2249)   ampicillin-sulbactam (Unasyn) 3 g in sodium chloride 0.9%  mL (0 g intravenous Stopped 3/29/25 2304)    ketorolac (Toradol) injection 30 mg (30 mg intravenous Given 3/29/25 2101)   dexAMETHasone (Decadron) injection 10 mg (10 mg intravenous Given 3/29/25 2102)   iohexol (OMNIPaque) 350 mg iodine/mL solution 75 mL (75 mL intravenous Given 3/29/25 2115)        Diagnostic/tests:  Labs Reviewed   GROUP A STREPTOCOCCUS, PCR - Abnormal       Result Value    Group A Strep PCR Detected (*)    CBC WITH AUTO DIFFERENTIAL - Abnormal    WBC 27.3 (*)     nRBC 0.0      RBC 5.59      Hemoglobin 14.1      Hematocrit 43.2      MCV 77 (*)     MCH 25.2 (*)     MCHC 32.6      RDW 13.0      Platelets 259      Neutrophils % 83.4      Immature Granulocytes %, Automated 0.7      Lymphocytes % 7.6      Monocytes % 6.8      Eosinophils % 1.1      Basophils % 0.4      Neutrophils Absolute 22.74 (*)     Immature Granulocytes Absolute, Automated 0.20      Lymphocytes Absolute 2.06      Monocytes Absolute 1.84 (*)     Eosinophils Absolute 0.31      Basophils Absolute 0.10     COMPREHENSIVE METABOLIC PANEL - Abnormal    Glucose 107 (*)     Sodium 136      Potassium 3.3 (*)     Chloride 104      Bicarbonate 22      Anion Gap 13      Urea Nitrogen 12      Creatinine 0.85      eGFR >90      Calcium 9.0      Albumin 4.2      Alkaline Phosphatase 61      Total Protein 7.3      AST 24      Bilirubin, Total 0.6      ALT 39     BLOOD CULTURE - Normal    Blood Culture No growth at 3 days     BLOOD CULTURE - Normal    Blood Culture No growth at 3 days     SARS-COV-2 AND INFLUENZA A/B PCR - Normal    Flu A Result Not Detected      Flu B Result Not Detected      Coronavirus 2019, PCR Not Detected      Narrative:     This assay is an FDA-cleared, in vitro diagnostic nucleic acid amplification test for the qualitative detection and differentiation of SARS CoV-2/ Influenza A/B from nasopharyngeal specimens collected from individuals with signs and symptoms of respiratory tract infections, and has been validated for use at [facility].  Negative results do not preclude COVID-19/ Influenza A/B infections and should not be used as the sole basis for diagnosis, treatment, or other management decisions. Testing for SARS CoV-2 is recommended only for patients who meet current clinical and/or epidemiological criteria defined by federal, state, or local public health directives.   LACTATE - Normal    Lactate 0.8      Narrative:     Venipuncture immediately after or during the administration of Metamizole may lead to falsely low results. Testing should be performed immediately prior to Metamizole dosing.        XR chest 1 view   Final Result   1.  No focal consolidation or pleural effusion.                  MACRO:   None        Signed by: Kayla Bradford 3/29/2025 10:16 PM   Dictation workstation:   [id number]      CT soft tissue neck w IV contrast   Final Result   Enhancing enlargement of the adenoid and palatine tonsils as well as   bilateral cervical lymphadenopathy. Findings result in narrowing of   the oropharynx and nasopharynx. Clinical correlation for airway   security suggested. No discrete tonsillar abscess or retropharyngeal   fluid present. Findings may be related to tonsillitis with reactive   lymphadenopathy. Lymphoproliferative process considered less likely   however clinical correlation recommended.             MACRO:   i        Signed by: Kayla Bradford 3/29/2025 10:15 PM   Dictation workstation:   [id number]             [facility] Summary:  Patient is severe swelling of his pharynx, strep a is positive.  Patient given IV antibiotics, steroids, NSAIDs as well as IV fluids.  He reports significant improvement of symptoms.  CT shows severe swelling of the oronasopharynx.    I have signed out the patient´s emergency department care to the attending physician Dr. Glaser. We discussed the pertinent history, physical exam, completed/pending test results (if applicable) and current treatment plan. Please refer to his/her chart for the patients  remaining Emergency Department course and final disposition.    Disposition:  ED Prescriptions       Medication Sig Dispense Start Date End Date Auth. Provider    amoxicillin (Amoxil) 500 mg capsule  (Status: Discontinued) Take 1 capsule (500 mg) by mouth every 12 hours for 10 days. 20 capsule 3/29/2025 3/29/2025 Alicia Glaser MD    amoxicillin (Amoxil) 500 mg capsule Take 1 capsule (500 mg) by mouth every 12 hours for 10 days. 20 capsule 3/29/2025 4/8/2025 Alicia Glaser MD              Procedure  Procedures     Chad Phoenix PA-C  04/03/25 1536

## 2025-04-30 ENCOUNTER — APPOINTMENT (OUTPATIENT)
Dept: OTOLARYNGOLOGY | Facility: CLINIC | Age: 30
End: 2025-04-30
Payer: COMMERCIAL

## 2025-04-30 VITALS — WEIGHT: 250 LBS | BODY MASS INDEX: 39.24 KG/M2 | TEMPERATURE: 97.4 F | HEIGHT: 67 IN

## 2025-04-30 DIAGNOSIS — G47.33 OBSTRUCTIVE SLEEP APNEA: ICD-10-CM

## 2025-04-30 DIAGNOSIS — J34.2 DEVIATED NASAL SEPTUM: ICD-10-CM

## 2025-04-30 DIAGNOSIS — H61.21 IMPACTED CERUMEN OF RIGHT EAR: ICD-10-CM

## 2025-04-30 DIAGNOSIS — J35.1 TONSILLAR HYPERTROPHY: Primary | ICD-10-CM

## 2025-04-30 PROCEDURE — 3008F BODY MASS INDEX DOCD: CPT | Performed by: OTOLARYNGOLOGY

## 2025-04-30 PROCEDURE — 99203 OFFICE O/P NEW LOW 30 MIN: CPT | Performed by: OTOLARYNGOLOGY

## 2025-04-30 NOTE — PROGRESS NOTES
"Chief Complaint   Patient presents with    New Patient Visit     NP HAD STREP THROAT, SEEN IN ER 3/29/25       Date of Evaluation: 4/30/2025   Jhonatan was seen today for new patient visit.  Diagnoses and all orders for this visit:  Tonsillar hypertrophy (Primary)  Obstructive sleep apnea       PLAN  He has very large obstructive tonsils that are certainly contributing to his obstructive sleep apnea.  He has a muffled hyponasal speech.  He does not have recurrent tonsillitis as an indication for tonsillectomy.  I would like to reevaluate him in 2 months to see if the tonsils get smaller as he gets further out from this recent episode of tonsillitis.  They are very large and obstructing and if they remain this large consideration of tonsillectomy should be discussed      HPI  Jhonatan Huffman is a 29 y.o. male with obstructive sleep apnea and tonsillar hypertrophy who was seen in the emergency room 4 weeks ago with an isolated episode of strep tonsillitis.  He was treated with antibiotics and the sore throat feels much better.  He has always been aware of large tonsils.  He has obstructive sleep apnea and is on CPAP.  He uses it most nights.  His right ear feels plugged    Physical Exam:  Last Recorded Vitals  Temperature 36.3 °C (97.4 °F), height 1.702 m (5' 7\"), weight 113 kg (250 lb). , Body mass index is 39.16 kg/m².  []General appearance: Well-developed, well-nourished in no acute distress, conversant with hyponasal voice quality    Head/face: No erythema or edema or facial tenderness, and normal facial nerve function bilaterally    External ear: Clear external auditory canals with normal pinnae large right obstructing cerumen impaction removed with subjective improvement in hearing  Tube status: N/A  Middle ear: Tympanic membranes intact and mobile, middle ears normal.  Tympanic membrane perforation: N/A  Mastoid bowl: N/A  Hearing: Normal conversational awareness at normal speech thresholds    Nose visualized " using: Anterior rhinoscopy  Nasal dorsum: Nontraumatic midline appearance  Septum: Deviated nasal septum  Inferior turbinates: Normal, pink  Secretions: Dry    Oral cavity and oropharynx: Normal  Teeth: Good condition  Floor of mouth: without lesions  Palate: Normal hard palate, soft palate and uvula  Oropharynx: Clear, no lesions present 4+ tonsils meeting in the midline  Buccal mucosa: Normal without masses or lesions  Lips: Normal    Nasopharynx: Inadequate mirror exam secondary to gag/anatomy    Neck:  Salivary glands: Normal bilateral parotid and submandibular glands by inspection and palpation.  Non-thyroid masses: No palpable masses or significant lymphadenopathy  Trachea: Midline  Thyroid: No thyromegaly or palpable nodules  Temporomandibular joint: Nontender  Cervical range of motion: Normal    Neurologic exam: Alert and oriented x3, appropriate affect.  Cranial nerves II-XII normal bilaterally  Extraocular movement: Extraocular movement intact, normal gaze alignment     Medical History[1]   Surgical History[2]       Medications:   Current Outpatient Medications   Medication Instructions    Abilify Asimtufii 960 mg/3.2 mL suspension,extended rel syring Inject 960 mg into the muscle every 8 (eight) weeks    albuterol 90 mcg/actuation inhaler INHALE TWO PUFFS INTO THE LUNGS FOUR TIMES DAILY AS NEEDED    FLUoxetine (PROZAC) 20 mg, Nightly    nicotine (Nicoderm CQ) 14 mg/24 hr patch 1 patch, transdermal, Daily    nicotine (Nicoderm CQ) 21 mg/24 hr patch PLACE 1 PATCH ONTO SKIN ONCE DAILY    nicotine (Nicoderm CQ) 7 mg/24 hr patch PLACE 1 PATCH ONTO SKIN EVERY 24 HOURS    nicotine polacrilex (Nicorette) 2 mg gum     varenicline tartrate (CHANTIX) 1 mg, 2 times daily        Allergies:  Allergies[3]       Jean Pierre Oliver MD         [1]   Past Medical History:  Diagnosis Date    Gallstones     History of tobacco use disorder     Pancreatitis (HHS-HCC)     Paranoid schizophrenia (Multi)     Sleep apnea    [2]   Past  Surgical History:  Procedure Laterality Date    CHOLECYSTECTOMY N/A 04/24/2024    Dr. Wheeler    WISDOM TOOTH EXTRACTION     [3]   Allergies  Allergen Reactions    Haldol [Haloperidol] Other     Muscle spasms

## 2025-06-25 ENCOUNTER — PREP FOR PROCEDURE (OUTPATIENT)
Dept: OTOLARYNGOLOGY | Facility: HOSPITAL | Age: 30
End: 2025-06-25

## 2025-06-25 ENCOUNTER — APPOINTMENT (OUTPATIENT)
Dept: OTOLARYNGOLOGY | Facility: CLINIC | Age: 30
End: 2025-06-25
Payer: COMMERCIAL

## 2025-06-25 VITALS — BODY MASS INDEX: 40.02 KG/M2 | WEIGHT: 255 LBS | HEIGHT: 67 IN | TEMPERATURE: 97.1 F

## 2025-06-25 DIAGNOSIS — J35.1 TONSILLAR HYPERTROPHY: Primary | ICD-10-CM

## 2025-06-25 DIAGNOSIS — J34.2 DEVIATED NASAL SEPTUM: ICD-10-CM

## 2025-06-25 DIAGNOSIS — G47.33 OBSTRUCTIVE SLEEP APNEA: ICD-10-CM

## 2025-06-25 PROCEDURE — 3008F BODY MASS INDEX DOCD: CPT | Performed by: OTOLARYNGOLOGY

## 2025-06-25 PROCEDURE — 99214 OFFICE O/P EST MOD 30 MIN: CPT | Performed by: OTOLARYNGOLOGY

## 2025-06-25 NOTE — PROGRESS NOTES
"Chief Complaint   Patient presents with    Follow-up     LOV 4/25 F/U TONSILS, DOING WELL      Date of Evaluation: 6/25/2025   Jhonatan was seen today for follow-up.  Diagnoses and all orders for this visit:  Tonsillar hypertrophy (Primary)  Obstructive sleep apnea  Deviated nasal septum         PLAN  He has 4+ kissing tonsils as a baseline.  He has hyponasal speech and chronic mouth breathing and obstructive sleep apnea.  His airway is so obstructed by the tonsils I would have recommended tonsillectomy.  I discussed the surgery in great detail including the risks of bleeding and infection airway obstruction and life-threatening bleeding and potential for reoperation.  I have recommended that this be done in the hospital and he stay overnight for observation      HPI  He returns in follow-up for tonsillar hypertrophy.  He feels like he is back to his baseline.  He is using CPAP every night.  He has mouth breathing and hyponasal speech.  No longer having tonsillar pain  Jhonatan Huffman is a 29 y.o. male with obstructive sleep apnea and tonsillar hypertrophy who was seen in the emergency room 4 weeks ago with an isolated episode of strep tonsillitis.  He was treated with antibiotics and the sore throat feels much better.  He has always been aware of large tonsils.  He has obstructive sleep apnea and is on CPAP.  He uses it most nights.  His right ear feels plugged    Physical Exam:  Last Recorded Vitals  Temperature 36.2 °C (97.1 °F), height 1.702 m (5' 7\"), weight 116 kg (255 lb). , Body mass index is 39.94 kg/m².  []General appearance: Well-developed, well-nourished in no acute distress, conversant with hyponasal voice quality    Head/face: No erythema or edema or facial tenderness, and normal facial nerve function bilaterally    External ear: Clear external auditory canals with normal pinnae large right obstructing cerumen impaction removed with subjective improvement in hearing  Tube status: N/A  Middle ear: " Tympanic membranes intact and mobile, middle ears normal.  Tympanic membrane perforation: N/A  Mastoid bowl: N/A  Hearing: Normal conversational awareness at normal speech thresholds    Nose visualized using: Anterior rhinoscopy  Nasal dorsum: Nontraumatic midline appearance  Septum: Deviated nasal septum  Inferior turbinates: Normal, pink  Secretions: Dry    Oral cavity and oropharynx: Normal  Teeth: Good condition  Floor of mouth: without lesions  Palate: Normal hard palate, soft palate and uvula  Oropharynx: Clear, no lesions present 4+ tonsils meeting in the midline  Buccal mucosa: Normal without masses or lesions  Lips: Normal    Nasopharynx: Inadequate mirror exam secondary to gag/anatomy    Neck:  Salivary glands: Normal bilateral parotid and submandibular glands by inspection and palpation.  Non-thyroid masses: No palpable masses or significant lymphadenopathy  Trachea: Midline  Thyroid: No thyromegaly or palpable nodules  Temporomandibular joint: Nontender  Cervical range of motion: Normal    Neurologic exam: Alert and oriented x3, appropriate affect.  Cranial nerves II-XII normal bilaterally  Extraocular movement: Extraocular movement intact, normal gaze alignment     Medical History[1]   Surgical History[2]       Medications:   Current Outpatient Medications   Medication Instructions    Abilify Asimtufii 960 mg/3.2 mL suspension,extended rel syring Inject 960 mg into the muscle every 8 (eight) weeks    albuterol 90 mcg/actuation inhaler INHALE TWO PUFFS INTO THE LUNGS FOUR TIMES DAILY AS NEEDED    FLUoxetine (PROZAC) 20 mg, Nightly    nicotine (Nicoderm CQ) 14 mg/24 hr patch 1 patch, transdermal, Daily    nicotine (Nicoderm CQ) 21 mg/24 hr patch PLACE 1 PATCH ONTO SKIN ONCE DAILY    nicotine (Nicoderm CQ) 7 mg/24 hr patch PLACE 1 PATCH ONTO SKIN EVERY 24 HOURS    nicotine polacrilex (Nicorette) 2 mg gum     varenicline tartrate (CHANTIX) 1 mg, 2 times daily        Allergies:  Allergies[3]       Jean Pierre WALLACE  MD [place]         [1]   Past Medical History:  Diagnosis Date    Gallstones     History of tobacco use disorder     Pancreatitis (HHS-HCC)     Paranoid schizophrenia (Multi)     Sleep apnea    [2]   Past Surgical History:  Procedure Laterality Date    CHOLECYSTECTOMY N/A 04/24/2024    Dr. Wheeler    WISDOM TOOTH EXTRACTION     [3]   Allergies  Allergen Reactions    Haldol [Haloperidol] Other     Muscle spasms

## 2025-07-31 ENCOUNTER — PRE-ADMISSION TESTING (OUTPATIENT)
Dept: PREADMISSION TESTING | Facility: HOSPITAL | Age: 30
End: 2025-07-31
Payer: COMMERCIAL

## 2025-07-31 VITALS
DIASTOLIC BLOOD PRESSURE: 75 MMHG | HEART RATE: 76 BPM | OXYGEN SATURATION: 99 % | HEIGHT: 67 IN | WEIGHT: 255 LBS | SYSTOLIC BLOOD PRESSURE: 109 MMHG | TEMPERATURE: 96.6 F | BODY MASS INDEX: 40.02 KG/M2 | RESPIRATION RATE: 16 BRPM

## 2025-07-31 DIAGNOSIS — Z01.818 PRE-OP TESTING: ICD-10-CM

## 2025-07-31 DIAGNOSIS — G47.33 OSA (OBSTRUCTIVE SLEEP APNEA): Primary | ICD-10-CM

## 2025-07-31 DIAGNOSIS — J35.1 TONSILLAR HYPERTROPHY: ICD-10-CM

## 2025-07-31 PROCEDURE — 99204 OFFICE O/P NEW MOD 45 MIN: CPT | Performed by: PHYSICIAN ASSISTANT

## 2025-07-31 PROCEDURE — 93005 ELECTROCARDIOGRAM TRACING: CPT

## 2025-07-31 ASSESSMENT — DUKE ACTIVITY SCORE INDEX (DASI)
CAN YOU RUN A SHORT DISTANCE: YES
CAN YOU DO YARD WORK LIKE RAKING LEAVES, WEEDING OR PUSHING A MOWER: YES
CAN YOU TAKE CARE OF YOURSELF (EAT, DRESS, BATHE, OR USE TOILET): YES
CAN YOU DO MODERATE WORK AROUND THE HOUSE LIKE VACUUMING, SWEEPING FLOORS OR CARRYING GROCERIES: YES
CAN YOU WALK A BLOCK OR TWO ON LEVEL GROUND: YES
CAN YOU HAVE SEXUAL RELATIONS: YES
TOTAL_SCORE: 58.2
DASI METS SCORE: 9.9
CAN YOU PARTICIPATE IN STRENOUS SPORTS LIKE SWIMMING, SINGLES TENNIS, FOOTBALL, BASKETBALL, OR SKIING: YES
CAN YOU DO LIGHT WORK AROUND THE HOUSE LIKE DUSTING OR WASHING DISHES: YES
CAN YOU CLIMB A FLIGHT OF STAIRS OR WALK UP A HILL: YES
CAN YOU WALK INDOORS, SUCH AS AROUND YOUR HOUSE: YES
CAN YOU DO HEAVY WORK AROUND THE HOUSE LIKE SCRUBBING FLOORS OR LIFTING AND MOVING HEAVY FURNITURE: YES
CAN YOU PARTICIPATE IN MODERATE RECREATIONAL ACTIVITIES LIKE GOLF, BOWLING, DANCING, DOUBLES TENNIS OR THROWING A BASEBALL OR FOOTBALL: YES

## 2025-07-31 NOTE — CPM/PAT H&P
"CPM/PAT Evaluation       Name: Jhonatan Huffman (Jhonatan Huffman)  /Age:  y.o.     In-Person       Chief Complaint: \"I have large tonsils\"    HPI  The patient is a 29 year old male.  He was diagnosed with tonsillar hypertrophy several years ago.  He has sleep apnea and is compliant with his CPAP.  He also has a deviated nasal septum.  He has a chronic muffled voice and is a mouth breather, but denies dysphagia or frequent sore throats.  He was seen by Dr. Oliver and a tonsillectomy is recommended at this time.    Past Medical History:   Diagnosis Date    Gallstones     History of tobacco use disorder     Pancreatitis ([facility])     Paranoid schizophrenia (Multi)     Sleep apnea        Past Surgical History:   Procedure Laterality Date    CHOLECYSTECTOMY N/A 2024    Dr. Wheeler    WISDOM TOOTH EXTRACTION         Family History   Problem Relation Name Age of Onset    Gallbladder disease Mother      Gallbladder disease Sister       Social History     Tobacco Use    Smoking status: Former     Current packs/day: 0.50     Average packs/day: 0.5 packs/day for 13.0 years (6.5 ttl pk-yrs)     Types: Cigarettes    Smokeless tobacco: Former     Types: Chew   Substance Use Topics    Alcohol use: Yes     Comment: RARELY     Social History     Substance and Sexual Activity   Drug Use Not Currently    Types: Cocaine, Marijuana, LSD, Oxycodone, Psilocybin    Comment: Other than marijuana he has not used drugs in past 5 years       Allergies   Allergen Reactions    Haldol [Haloperidol] Other     Muscle spasms       Current Outpatient Medications   Medication Sig Dispense Refill    Abilify Asimtufii 960 mg/3.2 mL suspension,extended rel syring Inject 3.2 mL (960 mg) into the muscle every 8 (eight) weeks. LAST DOSE 25       No current facility-administered medications for this visit.     Review of Systems   HENT:          See HPI   All other systems reviewed and are negative.    /75   Pulse 76   Temp 35.9 " "°C (96.6 °F) (Temporal)   Resp 16   Ht 1.702 m (5' 7\")   Wt 116 kg (255 lb)   SpO2 99%   BMI 39.94 kg/m²     Physical Exam  Vitals reviewed.   Constitutional:       Appearance: He is obese.   HENT:      Head: Normocephalic and atraumatic.      Mouth/Throat:      Mouth: Mucous membranes are moist.      Pharynx: Oropharynx is clear.      Comments: Enlarged tonsils.    Eyes:      Extraocular Movements: Extraocular movements intact.      Pupils: Pupils are equal, round, and reactive to light.       Cardiovascular:      Rate and Rhythm: Normal rate and regular rhythm.      Heart sounds: Normal heart sounds.   Pulmonary:      Effort: Pulmonary effort is normal.      Breath sounds: Normal breath sounds.   Abdominal:      General: There is no distension.      Palpations: Abdomen is soft.     Musculoskeletal:         General: No swelling.     Skin:     General: Skin is warm and dry.     Neurological:      General: No focal deficit present.      Mental Status: He is alert and oriented to person, place, and time.     Psychiatric:         Mood and Affect: Mood normal.         Behavior: Behavior normal.          PAT AIRWAY:   Airway:     Mallampati::  II    TM distance::  >3 FB    Neck ROM::  Full   Teeth intact, enlarged tonsils noted.    ASA:  II  DASI SCORE:  58.2  METS SCORE:  9.9  CHAD2 SCORE:  1.9%  REVISED CARDIAC RISK INDEX:  0.4%  STOP BANG SCORE:  4  CAPRINI DVT SCORE:  5  JUWAN SCORE:  0.07%  ARISCAT SCORE:  1.6%    EKG (preliminary in PAT) - normal sinus rhythm    CBC, BMP done 5/22/2025:    COUNT COMPLETE AUTO&AUTO DIFRNTL WBC Routine  Order: [id number]  Component  Ref Range & Units 2 mo ago   WHITE BLOOD CELL(WBC) COUNT  3.4 - 10.8 x10E3/uL 10.3   RED BLOOD CELL (RBC) COUNT  4.14 - 5.80 x10E6/uL 6.26 High    HEMOGLOBIN  13.0 - 17.7 g/dL 16.2   HEMATOCRIT  37.5 - 51.0 % 48.8   MCV  79 - 97 fL 78 Low    MCH  26.6 - 33.0 pg 25.9 Low    MCHC  31.5 - 35.7 g/dL 33.2   RDW  11.6 - 15.4 % 15.2   PLATELETS  150 - 450 " x10E3/uL 378   NEUTROPHILS  Not Estab. % 57   LYMPHS  Not Estab. % 33   MONOCYTES  Not Estab. % 6   EOS  Not Estab. % 2   BASOPHILS  Not Estab. % 1   NEUTROPHILS (ABSOLUTE)  1.4 - 7.0 x10E3/uL 6   LYMPHS (ABSOLUTE)  0.7 - 3.1 x10E3/uL 3.4 High    MONOCYTES(ABSOLUTE)  0.1 - 0.9 x10E3/uL 0.6   EOS (ABSOLUTE)  0.0 - 0.4 x10E3/uL 0.2   BASO (ABSOLUTE)  0.0 - 0.2 x10E3/uL 0.1   IMMATURE GRANULOCYTES  Not Estab. % 1   IMMATURE GRANS (ABS)  0.0 - 0.1 x10E3/uL 0.1      Contains abnormal data BASIC METABOLIC PANEL CALCIUM TOTAL Routine  Order: [id number]  Component  Ref Range & Units 2 mo ago   GLUCOSE, SERUM  70 - 99 mg/dL 84   BUN  6 - 20 mg/dL 12   CREATININE, SERUM  0.76 - 1.27 mg/dL 0.76   eGFR  >59 mL/min/1.73 126   BUN/CREATININE RATIO  9 - 20 16   SODIUM, SERUM  134 - 144 mmol/L 140   POTASSIUM, SERUM  3.5 - 5.2 mmol/L 4.3   CHLORIDE, SERUM  96 - 106 mmol/L 103   CARBON DIOXIDE, TOTAL  20 - 29 mmol/L 19 Low    CALCIUM, SERUM  8.7 - 10.2 mg/dL 9.9       Assessment and Plan:     Tonsillar hypertrophy:  Bilateral tonsillectomy  YULIYA - compliant with CPAP  Obesity - BMI: 39.94  Schizophrenia - well controlled with Abilify injection every 8 weeks - followed by Kori Schaffer, CNP - Psychiatry    Lyssa Castañeda PA-C

## 2025-07-31 NOTE — SIGNIFICANT EVENT
PATIENT WAS DIAGNOSED APPROX A YEAR OR SO AGO WITH SLEEP APNEA, HE HAS CPAP MACHINE AND HE USES IT MOST NIGHTS, NOT EVERY NIGHT

## 2025-07-31 NOTE — PREPROCEDURE INSTRUCTIONS
Medication List            Accurate as of July 31, 2025 10:34 AM. Always use your most recent med list.                Michael Francomtufii 960 mg/3.2 mL injection  Generic drug: ARIPiprazole ER (2 month)  Medication Adjustments for Surgery: Take/Use as prescribed                Preoperative Fasting Guidelines    Why must I stop eating and drinking near surgery time?  With sedation, food or liquid in your stomach can enter your lungs causing serious complications  Increases nausea and vomiting    When do I need to stop eating and drinking before my surgery?  Do not eat any food after midnight the night before your surgery/procedure.  You may have up to 13.5 ounces of clear liquid until TWO hours before your instructed arrival time to the hospital.  This includes water, black tea/coffee, (no milk or cream) apple juice, and electrolyte drinks (Gatorade)  You may chew gum until TWO hours before your surgery/procedure                                          Patient and Family Education Quitting Smoking or Tobacco       Recognizing Dangerous Situations:   Alcohol use during the first month after quitting   Being around smoke or someone who smokes    Times situation routinely smoked   Triggers: car, breaks, coffee, when awakening, social events     Coping Skills:   Learning new ways to manage stress   Exercising   Relaxation breathing   Change routines   Distraction techniques     Websites:   Smoke-Free - offers free text messages and an gwendolyn to help you quit. Info includes eating and mood issues that may come with quitting. There is a Live Helpline to talk to an expert. Go to smokefree.gov     Become an Ex-Smoker - the free EX Plan is based on scientific research and useful advice from ex-smokers. It isn't just about quitting smoking. It's about re-learning life without cigarettes using a 3-step program. Go to becomeanex.org     Centers for Disease Control - offer many suggestions for helping you quit. Includes a Quit  Guide and real-life stories. There are sections for specific groups such as LGBT, , different ethnic groups, and pregnant women. Go to cdc.gov/tobacco/campaign/tips     Other Resources:     [place] Tobacco Quit Line - call 1-800-QUIT-NOW or 2-126-104-4288.   [facility] 2-1-1 - to find local programs and resources. Call 211 or go to 211.MediaSilo. [facility] Tobacco Cessation Program - call [phone].   American Lung Association - offers classes for quitting smoking. Some places may charge a fee. For a list of classes, go to lung.org or call 1-800-LUNG-USA.     Some things to think about:   The health benefits of quitting smoking can help most of the major parts of your body.   There is no safe amount of cigarette smoke. Quitting smoking can add years to your life.   When you quit, you'll also protect your loved ones from dangerous secondhand smoke.   Make a plan, join a support group, and talk to your physician to assist in quitting smoking.     [facility], 11/20; PI-602 (6/22)              PAT DISCHARGE INSTRUCTIONS    The Same Day Surgery (SDS) Department of the hospital where your procedure will be performed will contact you after 2:00 PM the day before your surgery. If you are scheduled on a Monday, or a Tuesday following a Monday holiday, they will call on the last business day prior to your surgery.  Please check your voicemail for any missed messages.    [facility]  [address] [place] [address] [phone]  Second Floor      [facility]  [id number] [place] [address], [address] [phone]  Second Floor  *PLEASE CALL ABOVE NUMBER FOR ARRIVAL TIME       [facility]  [address] [place] [address] [phone]    Please let your surgeon know if:      You develop any open sores, shingles, burning or painful urination as these may increase your risk of an infection.    You no longer wish to have the surgery.   Any other personal circumstances change that may lead to the need to cancel or defer this surgery-such as being sick or getting admitted to any hospital within one week of your planned procedure.    Your contact details change, such as a change of address or phone number.    Starting now:     Please DO NOT drink alcohol or smoke for 24 hours before surgery. It is well known that quitting smoking can make a huge difference to your health and recovery from surgery. The longer you abstain from smoking, the better your chances of a healthy recovery. If you need help with quitting, call 1-800-QUIT-NOW to be connected to a trained counselor who will discuss the best methods to help you quit.     Before your surgery:    Please stop all supplements 7 days prior to surgery. Or as directed by your surgeon.   Please stop taking NSAID pain medicine such as Advil and Motrin 7 days before surgery.    If you develop any fever, cough, cold, rashes, cuts, scratches, scrapes, urinary symptoms or infection anywhere on your body (including teeth and gums) prior to surgery, please call your surgeon’s office as soon as possible. This may require treatment to reduce the chance of cancellation on the day of surgery.    The day before your surgery:   DIET- Please follow the diet instructions at the top of your packet.   Get a good night’s rest.  Use the special soap for bathing if you have been instructed to use one.    Scheduled surgery times may change and you will be notified if this occurs - please check your personal voicemail for any updates.     On the morning of surgery:   Wear comfortable, loose fitting clothes which open in the front. Please do not wear moisturizers, creams, lotions, makeup or perfume.    Please bring with you to surgery:   Photo ID and insurance card   Current list of medicines and allergies   Pacemaker/ Defibrillator/Heart stent cards   CPAP machine and mask    Slings/  splints/ crutches   A copy of your complete advanced directive/DHPOA.    Please do NOT bring with you to surgery:   All jewelry and valuables should be left at home.   Prosthetic devices such as contact lenses, hearing aids, dentures, eyelash extensions, hairpins and body piercings must be removed prior to going in to the surgical suite.    After outpatient surgery:   A responsible adult MUST accompany you at the time of discharge and stay with you for 24 hours after your surgery. You may NOT drive yourself home after surgery.    Do not drive, operate machinery, make critical decisions or do activities that require co-ordination or balance until after a night’s sleep.   Do not drink alcoholic beverages for 24 hours.   Instructions for resuming your medications will be provided by your surgeon.    CALL YOUR DOCTOR AFTER SURGERY IF YOU HAVE:     Chills and/or a fever of 101° F or higher.    Redness, swelling, pus or drainage from your surgical wound or a bad smell from the wound.    Lightheadedness, fainting or confusion.    Persistent vomiting (throwing up) and are not able to eat or drink for 12 hours.    Three or more loose, watery bowel movements in 24 hours (diarrhea).   Difficulty or pain while urinating( after non-urological surgery)    Pain and swelling in your legs, especially if it is only on one side.    Difficulty breathing or are breathing faster than normal.    Any new concerning symptoms.

## 2025-08-01 LAB
ATRIAL RATE: 76 BPM
P AXIS: 27 DEGREES
P OFFSET: 209 MS
P ONSET: 161 MS
PR INTERVAL: 128 MS
Q ONSET: 225 MS
QRS COUNT: 12 BEATS
QRS DURATION: 80 MS
QT INTERVAL: 346 MS
QTC CALCULATION(BAZETT): 389 MS
QTC FREDERICIA: 374 MS
R AXIS: 36 DEGREES
T AXIS: 42 DEGREES
T OFFSET: 398 MS
VENTRICULAR RATE: 76 BPM

## 2025-08-14 ENCOUNTER — ANESTHESIA (OUTPATIENT)
Dept: OPERATING ROOM | Facility: HOSPITAL | Age: 30
End: 2025-08-14
Payer: MEDICARE

## 2025-08-14 ENCOUNTER — ANESTHESIA EVENT (OUTPATIENT)
Dept: OPERATING ROOM | Facility: HOSPITAL | Age: 30
End: 2025-08-14
Payer: MEDICARE

## 2025-08-14 ENCOUNTER — HOSPITAL ENCOUNTER (OUTPATIENT)
Facility: HOSPITAL | Age: 30
Discharge: HOME | End: 2025-08-15
Attending: OTOLARYNGOLOGY | Admitting: OTOLARYNGOLOGY
Payer: MEDICARE

## 2025-08-14 DIAGNOSIS — J35.1 TONSILLAR HYPERTROPHY: ICD-10-CM

## 2025-08-14 PROBLEM — G47.33 OBSTRUCTIVE SLEEP APNEA: Status: ACTIVE | Noted: 2025-08-14

## 2025-08-14 PROCEDURE — 3600000008 HC OR TIME - EACH INCREMENTAL 1 MINUTE - PROCEDURE LEVEL THREE: Performed by: OTOLARYNGOLOGY

## 2025-08-14 PROCEDURE — 3700000001 HC GENERAL ANESTHESIA TIME - INITIAL BASE CHARGE: Performed by: OTOLARYNGOLOGY

## 2025-08-14 PROCEDURE — 3700000002 HC GENERAL ANESTHESIA TIME - EACH INCREMENTAL 1 MINUTE: Performed by: OTOLARYNGOLOGY

## 2025-08-14 PROCEDURE — 7100000001 HC RECOVERY ROOM TIME - INITIAL BASE CHARGE: Performed by: OTOLARYNGOLOGY

## 2025-08-14 PROCEDURE — 7100000011 HC EXTENDED STAY RECOVERY HOURLY - NURSING UNIT

## 2025-08-14 PROCEDURE — 7100000002 HC RECOVERY ROOM TIME - EACH INCREMENTAL 1 MINUTE: Performed by: OTOLARYNGOLOGY

## 2025-08-14 PROCEDURE — 0752T DGTZ GLS MCRSCP SLD LVL III: CPT | Mod: TC,TRILAB,WESLAB | Performed by: OTOLARYNGOLOGY

## 2025-08-14 PROCEDURE — A42826 PR REMOVAL OF TONSILS,12+ Y/O: Performed by: STUDENT IN AN ORGANIZED HEALTH CARE EDUCATION/TRAINING PROGRAM

## 2025-08-14 PROCEDURE — 2500000004 HC RX 250 GENERAL PHARMACY W/ HCPCS (ALT 636 FOR OP/ED): Performed by: ANESTHESIOLOGIST ASSISTANT

## 2025-08-14 PROCEDURE — 2500000001 HC RX 250 WO HCPCS SELF ADMINISTERED DRUGS (ALT 637 FOR MEDICARE OP): Performed by: OTOLARYNGOLOGY

## 2025-08-14 PROCEDURE — 2500000005 HC RX 250 GENERAL PHARMACY W/O HCPCS: Performed by: OTOLARYNGOLOGY

## 2025-08-14 PROCEDURE — 2500000004 HC RX 250 GENERAL PHARMACY W/ HCPCS (ALT 636 FOR OP/ED): Performed by: OTOLARYNGOLOGY

## 2025-08-14 PROCEDURE — 42826 REMOVAL OF TONSILS: CPT | Performed by: OTOLARYNGOLOGY

## 2025-08-14 PROCEDURE — 2500000004 HC RX 250 GENERAL PHARMACY W/ HCPCS (ALT 636 FOR OP/ED): Performed by: STUDENT IN AN ORGANIZED HEALTH CARE EDUCATION/TRAINING PROGRAM

## 2025-08-14 PROCEDURE — A42826 PR REMOVAL OF TONSILS,12+ Y/O: Performed by: ANESTHESIOLOGIST ASSISTANT

## 2025-08-14 PROCEDURE — 2720000007 HC OR 272 NO HCPCS: Performed by: OTOLARYNGOLOGY

## 2025-08-14 PROCEDURE — 3600000003 HC OR TIME - INITIAL BASE CHARGE - PROCEDURE LEVEL THREE: Performed by: OTOLARYNGOLOGY

## 2025-08-14 RX ORDER — FENTANYL CITRATE 50 UG/ML
25 INJECTION, SOLUTION INTRAMUSCULAR; INTRAVENOUS EVERY 5 MIN PRN
Status: DISCONTINUED | OUTPATIENT
Start: 2025-08-14 | End: 2025-08-14 | Stop reason: HOSPADM

## 2025-08-14 RX ORDER — DIPHENHYDRAMINE HCL 25 MG
25 TABLET ORAL EVERY 6 HOURS PRN
Status: DISCONTINUED | OUTPATIENT
Start: 2025-08-14 | End: 2025-08-15 | Stop reason: HOSPADM

## 2025-08-14 RX ORDER — MIDAZOLAM HYDROCHLORIDE 1 MG/ML
INJECTION, SOLUTION INTRAMUSCULAR; INTRAVENOUS AS NEEDED
Status: DISCONTINUED | OUTPATIENT
Start: 2025-08-14 | End: 2025-08-14

## 2025-08-14 RX ORDER — IPRATROPIUM BROMIDE 0.5 MG/2.5ML
500 SOLUTION RESPIRATORY (INHALATION) AS NEEDED
Status: DISCONTINUED | OUTPATIENT
Start: 2025-08-14 | End: 2025-08-14 | Stop reason: HOSPADM

## 2025-08-14 RX ORDER — ONDANSETRON 4 MG/1
4 TABLET, ORALLY DISINTEGRATING ORAL EVERY 8 HOURS PRN
Status: DISCONTINUED | OUTPATIENT
Start: 2025-08-14 | End: 2025-08-15 | Stop reason: HOSPADM

## 2025-08-14 RX ORDER — OXYCODONE HYDROCHLORIDE 5 MG/1
10 TABLET ORAL EVERY 4 HOURS PRN
Status: DISCONTINUED | OUTPATIENT
Start: 2025-08-14 | End: 2025-08-15 | Stop reason: HOSPADM

## 2025-08-14 RX ORDER — ONDANSETRON HYDROCHLORIDE 2 MG/ML
4 INJECTION, SOLUTION INTRAVENOUS EVERY 8 HOURS PRN
Status: DISCONTINUED | OUTPATIENT
Start: 2025-08-14 | End: 2025-08-15 | Stop reason: HOSPADM

## 2025-08-14 RX ORDER — SODIUM CHLORIDE, SODIUM LACTATE, POTASSIUM CHLORIDE, CALCIUM CHLORIDE 600; 310; 30; 20 MG/100ML; MG/100ML; MG/100ML; MG/100ML
100 INJECTION, SOLUTION INTRAVENOUS CONTINUOUS
Status: DISCONTINUED | OUTPATIENT
Start: 2025-08-14 | End: 2025-08-15 | Stop reason: HOSPADM

## 2025-08-14 RX ORDER — OXYCODONE HYDROCHLORIDE 5 MG/1
5 TABLET ORAL ONCE AS NEEDED
Status: DISCONTINUED | OUTPATIENT
Start: 2025-08-14 | End: 2025-08-14 | Stop reason: HOSPADM

## 2025-08-14 RX ORDER — TRANEXAMIC ACID 1 G/10ML
1000 INJECTION, SOLUTION INTRAVENOUS ONCE
Status: COMPLETED | OUTPATIENT
Start: 2025-08-14 | End: 2025-08-14

## 2025-08-14 RX ORDER — DEXMEDETOMIDINE IN 0.9 % NACL 20 MCG/5ML
SYRINGE (ML) INTRAVENOUS AS NEEDED
Status: DISCONTINUED | OUTPATIENT
Start: 2025-08-14 | End: 2025-08-14

## 2025-08-14 RX ORDER — FENTANYL CITRATE 50 UG/ML
50 INJECTION, SOLUTION INTRAMUSCULAR; INTRAVENOUS EVERY 5 MIN PRN
Status: DISCONTINUED | OUTPATIENT
Start: 2025-08-14 | End: 2025-08-14 | Stop reason: HOSPADM

## 2025-08-14 RX ORDER — ACETAMINOPHEN 325 MG/1
650 TABLET ORAL EVERY 4 HOURS PRN
Status: DISCONTINUED | OUTPATIENT
Start: 2025-08-14 | End: 2025-08-15 | Stop reason: HOSPADM

## 2025-08-14 RX ORDER — ALBUTEROL SULFATE 0.83 MG/ML
2.5 SOLUTION RESPIRATORY (INHALATION) ONCE AS NEEDED
Status: DISCONTINUED | OUTPATIENT
Start: 2025-08-14 | End: 2025-08-14 | Stop reason: HOSPADM

## 2025-08-14 RX ORDER — FENTANYL CITRATE 50 UG/ML
INJECTION, SOLUTION INTRAMUSCULAR; INTRAVENOUS AS NEEDED
Status: DISCONTINUED | OUTPATIENT
Start: 2025-08-14 | End: 2025-08-14

## 2025-08-14 RX ORDER — LIDOCAINE HYDROCHLORIDE 10 MG/ML
INJECTION, SOLUTION INFILTRATION; PERINEURAL AS NEEDED
Status: DISCONTINUED | OUTPATIENT
Start: 2025-08-14 | End: 2025-08-14

## 2025-08-14 RX ORDER — DIPHENHYDRAMINE HYDROCHLORIDE 50 MG/ML
12.5 INJECTION, SOLUTION INTRAMUSCULAR; INTRAVENOUS ONCE AS NEEDED
Status: DISCONTINUED | OUTPATIENT
Start: 2025-08-14 | End: 2025-08-14 | Stop reason: HOSPADM

## 2025-08-14 RX ORDER — ACETAMINOPHEN 160 MG/5ML
650 SOLUTION ORAL EVERY 6 HOURS
Status: DISCONTINUED | OUTPATIENT
Start: 2025-08-14 | End: 2025-08-15 | Stop reason: HOSPADM

## 2025-08-14 RX ORDER — ACETAMINOPHEN 325 MG/1
650 TABLET ORAL EVERY 6 HOURS
Status: DISCONTINUED | OUTPATIENT
Start: 2025-08-14 | End: 2025-08-15 | Stop reason: HOSPADM

## 2025-08-14 RX ORDER — ONDANSETRON HYDROCHLORIDE 2 MG/ML
4 INJECTION, SOLUTION INTRAVENOUS ONCE AS NEEDED
Status: DISCONTINUED | OUTPATIENT
Start: 2025-08-14 | End: 2025-08-14 | Stop reason: HOSPADM

## 2025-08-14 RX ORDER — LABETALOL HYDROCHLORIDE 5 MG/ML
5 INJECTION, SOLUTION INTRAVENOUS ONCE AS NEEDED
Status: DISCONTINUED | OUTPATIENT
Start: 2025-08-14 | End: 2025-08-14 | Stop reason: HOSPADM

## 2025-08-14 RX ORDER — HYDRALAZINE HYDROCHLORIDE 20 MG/ML
5 INJECTION INTRAMUSCULAR; INTRAVENOUS EVERY 30 MIN PRN
Status: DISCONTINUED | OUTPATIENT
Start: 2025-08-14 | End: 2025-08-14 | Stop reason: HOSPADM

## 2025-08-14 RX ORDER — ACETAMINOPHEN 650 MG/1
650 SUPPOSITORY RECTAL EVERY 6 HOURS
Status: DISCONTINUED | OUTPATIENT
Start: 2025-08-14 | End: 2025-08-15 | Stop reason: HOSPADM

## 2025-08-14 RX ORDER — PROPOFOL 10 MG/ML
INJECTION, EMULSION INTRAVENOUS AS NEEDED
Status: DISCONTINUED | OUTPATIENT
Start: 2025-08-14 | End: 2025-08-14

## 2025-08-14 RX ORDER — PROMETHAZINE HYDROCHLORIDE 25 MG/ML
6.25 INJECTION, SOLUTION INTRAMUSCULAR; INTRAVENOUS ONCE AS NEEDED
Status: DISCONTINUED | OUTPATIENT
Start: 2025-08-14 | End: 2025-08-14 | Stop reason: HOSPADM

## 2025-08-14 RX ORDER — OXYCODONE HYDROCHLORIDE 5 MG/1
5 TABLET ORAL EVERY 6 HOURS PRN
Status: DISCONTINUED | OUTPATIENT
Start: 2025-08-14 | End: 2025-08-15 | Stop reason: HOSPADM

## 2025-08-14 RX ORDER — SODIUM CHLORIDE, SODIUM LACTATE, POTASSIUM CHLORIDE, CALCIUM CHLORIDE 600; 310; 30; 20 MG/100ML; MG/100ML; MG/100ML; MG/100ML
INJECTION, SOLUTION INTRAVENOUS CONTINUOUS PRN
Status: DISCONTINUED | OUTPATIENT
Start: 2025-08-14 | End: 2025-08-14

## 2025-08-14 RX ORDER — ROCURONIUM BROMIDE 10 MG/ML
INJECTION, SOLUTION INTRAVENOUS AS NEEDED
Status: DISCONTINUED | OUTPATIENT
Start: 2025-08-14 | End: 2025-08-14

## 2025-08-14 RX ORDER — SODIUM CHLORIDE, SODIUM LACTATE, POTASSIUM CHLORIDE, CALCIUM CHLORIDE 600; 310; 30; 20 MG/100ML; MG/100ML; MG/100ML; MG/100ML
100 INJECTION, SOLUTION INTRAVENOUS CONTINUOUS
Status: DISCONTINUED | OUTPATIENT
Start: 2025-08-14 | End: 2025-08-14 | Stop reason: HOSPADM

## 2025-08-14 RX ORDER — NALOXONE HYDROCHLORIDE 0.4 MG/ML
0.2 INJECTION, SOLUTION INTRAMUSCULAR; INTRAVENOUS; SUBCUTANEOUS EVERY 5 MIN PRN
Status: DISCONTINUED | OUTPATIENT
Start: 2025-08-14 | End: 2025-08-15 | Stop reason: HOSPADM

## 2025-08-14 RX ADMIN — TRANEXAMIC ACID 1000 MG: 100 INJECTION INTRAVENOUS at 10:30

## 2025-08-14 RX ADMIN — MIDAZOLAM 2 MG: 1 INJECTION INTRAMUSCULAR; INTRAVENOUS at 10:08

## 2025-08-14 RX ADMIN — Medication 10 MCG: at 10:50

## 2025-08-14 RX ADMIN — OXYCODONE HYDROCHLORIDE 5 MG: 5 TABLET ORAL at 22:46

## 2025-08-14 RX ADMIN — SODIUM CHLORIDE, SODIUM LACTATE, POTASSIUM CHLORIDE, AND CALCIUM CHLORIDE 100 ML/HR: 600; 310; 30; 20 INJECTION, SOLUTION INTRAVENOUS at 12:23

## 2025-08-14 RX ADMIN — LIDOCAINE HYDROCHLORIDE 50 MG: 10 INJECTION, SOLUTION INFILTRATION; PERINEURAL at 10:22

## 2025-08-14 RX ADMIN — ACETAMINOPHEN 650 MG: 325 TABLET ORAL at 18:14

## 2025-08-14 RX ADMIN — SODIUM CHLORIDE, POTASSIUM CHLORIDE, SODIUM LACTATE AND CALCIUM CHLORIDE: 600; 310; 30; 20 INJECTION, SOLUTION INTRAVENOUS at 10:08

## 2025-08-14 RX ADMIN — FENTANYL CITRATE 50 MCG: 50 INJECTION, SOLUTION INTRAMUSCULAR; INTRAVENOUS at 10:30

## 2025-08-14 RX ADMIN — FENTANYL CITRATE 50 MCG: 50 INJECTION INTRAMUSCULAR; INTRAVENOUS at 11:16

## 2025-08-14 RX ADMIN — PROPOFOL 200 MG: 10 INJECTION, EMULSION INTRAVENOUS at 10:22

## 2025-08-14 RX ADMIN — ACETAMINOPHEN 650 MG: 325 TABLET ORAL at 12:22

## 2025-08-14 RX ADMIN — FENTANYL CITRATE 50 MCG: 50 INJECTION, SOLUTION INTRAMUSCULAR; INTRAVENOUS at 10:22

## 2025-08-14 RX ADMIN — DEXAMETHASONE SODIUM PHOSPHATE 10 MG: 4 INJECTION, SOLUTION INTRAMUSCULAR; INTRAVENOUS at 10:15

## 2025-08-14 RX ADMIN — ROCURONIUM BROMIDE 50 MG: 10 INJECTION, SOLUTION INTRAVENOUS at 10:22

## 2025-08-14 RX ADMIN — Medication 10 MCG: at 10:35

## 2025-08-14 RX ADMIN — ACETAMINOPHEN 650 MG: 325 TABLET ORAL at 23:53

## 2025-08-14 SDOH — SOCIAL STABILITY: SOCIAL INSECURITY: ABUSE: ADULT

## 2025-08-14 SDOH — SOCIAL STABILITY: SOCIAL INSECURITY: WITHIN THE LAST YEAR, HAVE YOU BEEN AFRAID OF YOUR PARTNER OR EX-PARTNER?: NO

## 2025-08-14 SDOH — ECONOMIC STABILITY: HOUSING INSECURITY: IN THE LAST 12 MONTHS, WAS THERE A TIME WHEN YOU WERE NOT ABLE TO PAY THE MORTGAGE OR RENT ON TIME?: NO

## 2025-08-14 SDOH — ECONOMIC STABILITY: HOUSING INSECURITY: IN THE PAST 12 MONTHS, HOW MANY TIMES HAVE YOU MOVED WHERE YOU WERE LIVING?: 0

## 2025-08-14 SDOH — SOCIAL STABILITY: SOCIAL INSECURITY: WITHIN THE LAST YEAR, HAVE YOU BEEN HUMILIATED OR EMOTIONALLY ABUSED IN OTHER WAYS BY YOUR PARTNER OR EX-PARTNER?: NO

## 2025-08-14 SDOH — ECONOMIC STABILITY: HOUSING INSECURITY: AT ANY TIME IN THE PAST 12 MONTHS, WERE YOU HOMELESS OR LIVING IN A SHELTER (INCLUDING NOW)?: NO

## 2025-08-14 SDOH — ECONOMIC STABILITY: INCOME INSECURITY: IN THE PAST 12 MONTHS HAS THE ELECTRIC, GAS, OIL, OR WATER COMPANY THREATENED TO SHUT OFF SERVICES IN YOUR HOME?: NO

## 2025-08-14 SDOH — SOCIAL STABILITY: SOCIAL INSECURITY
WITHIN THE LAST YEAR, HAVE YOU BEEN RAPED OR FORCED TO HAVE ANY KIND OF SEXUAL ACTIVITY BY YOUR PARTNER OR EX-PARTNER?: NO

## 2025-08-14 SDOH — ECONOMIC STABILITY: FOOD INSECURITY: HOW HARD IS IT FOR YOU TO PAY FOR THE VERY BASICS LIKE FOOD, HOUSING, MEDICAL CARE, AND HEATING?: NOT HARD AT ALL

## 2025-08-14 SDOH — ECONOMIC STABILITY: FOOD INSECURITY: WITHIN THE PAST 12 MONTHS, THE FOOD YOU BOUGHT JUST DIDN'T LAST AND YOU DIDN'T HAVE MONEY TO GET MORE.: NEVER TRUE

## 2025-08-14 SDOH — SOCIAL STABILITY: SOCIAL INSECURITY: DOES ANYONE TRY TO KEEP YOU FROM HAVING/CONTACTING OTHER FRIENDS OR DOING THINGS OUTSIDE YOUR HOME?: NO

## 2025-08-14 SDOH — SOCIAL STABILITY: SOCIAL INSECURITY: HAVE YOU HAD ANY THOUGHTS OF HARMING ANYONE ELSE?: NO

## 2025-08-14 SDOH — SOCIAL STABILITY: SOCIAL INSECURITY: HAS ANYONE EVER THREATENED TO HURT YOUR FAMILY OR YOUR PETS?: NO

## 2025-08-14 SDOH — SOCIAL STABILITY: SOCIAL INSECURITY: WERE YOU ABLE TO COMPLETE ALL THE BEHAVIORAL HEALTH SCREENINGS?: YES

## 2025-08-14 SDOH — ECONOMIC STABILITY: HOUSING INSECURITY: DO YOU FEEL UNSAFE GOING BACK TO THE PLACE WHERE YOU LIVE?: YES

## 2025-08-14 SDOH — SOCIAL STABILITY: SOCIAL INSECURITY: ARE YOU OR HAVE YOU BEEN THREATENED OR ABUSED PHYSICALLY, EMOTIONALLY, OR SEXUALLY BY ANYONE?: NO

## 2025-08-14 SDOH — SOCIAL STABILITY: SOCIAL INSECURITY: ARE THERE ANY APPARENT SIGNS OF INJURIES/BEHAVIORS THAT COULD BE RELATED TO ABUSE/NEGLECT?: NO

## 2025-08-14 SDOH — ECONOMIC STABILITY: FOOD INSECURITY: WITHIN THE PAST 12 MONTHS, YOU WORRIED THAT YOUR FOOD WOULD RUN OUT BEFORE YOU GOT THE MONEY TO BUY MORE.: NEVER TRUE

## 2025-08-14 SDOH — SOCIAL STABILITY: SOCIAL INSECURITY: HAVE YOU HAD THOUGHTS OF HARMING ANYONE ELSE?: NO

## 2025-08-14 SDOH — ECONOMIC STABILITY: TRANSPORTATION INSECURITY: IN THE PAST 12 MONTHS, HAS LACK OF TRANSPORTATION KEPT YOU FROM MEDICAL APPOINTMENTS OR FROM GETTING MEDICATIONS?: NO

## 2025-08-14 SDOH — SOCIAL STABILITY: SOCIAL INSECURITY: DO YOU FEEL ANYONE HAS EXPLOITED OR TAKEN ADVANTAGE OF YOU FINANCIALLY OR OF YOUR PERSONAL PROPERTY?: NO

## 2025-08-14 SDOH — SOCIAL STABILITY: SOCIAL INSECURITY: DO YOU FEEL UNSAFE GOING BACK TO THE PLACE WHERE YOU ARE LIVING?: NO

## 2025-08-14 ASSESSMENT — PAIN DESCRIPTION - LOCATION
LOCATION: THROAT

## 2025-08-14 ASSESSMENT — ACTIVITIES OF DAILY LIVING (ADL)
ADEQUATE_TO_COMPLETE_ADL: YES
FEEDING YOURSELF: INDEPENDENT
DRESSING YOURSELF: INDEPENDENT
BATHING: INDEPENDENT
WALKS IN HOME: INDEPENDENT
HEARING - RIGHT EAR: FUNCTIONAL
LACK_OF_TRANSPORTATION: NO
JUDGMENT_ADEQUATE_SAFELY_COMPLETE_DAILY_ACTIVITIES: YES
GROOMING: INDEPENDENT
PATIENT'S MEMORY ADEQUATE TO SAFELY COMPLETE DAILY ACTIVITIES?: YES
LACK_OF_TRANSPORTATION: NO
HEARING - LEFT EAR: FUNCTIONAL
TOILETING: INDEPENDENT
ASSISTIVE_DEVICE: NONE;EYEGLASSES

## 2025-08-14 ASSESSMENT — COGNITIVE AND FUNCTIONAL STATUS - GENERAL
MOBILITY SCORE: 24
MOBILITY SCORE: 24
DAILY ACTIVITIY SCORE: 24
PATIENT BASELINE BEDBOUND: NO
DAILY ACTIVITIY SCORE: 24

## 2025-08-14 ASSESSMENT — PAIN DESCRIPTION - DESCRIPTORS
DESCRIPTORS: SORE
DESCRIPTORS: ACHING;SORE
DESCRIPTORS: ACHING;SORE

## 2025-08-14 ASSESSMENT — PAIN - FUNCTIONAL ASSESSMENT
PAIN_FUNCTIONAL_ASSESSMENT: 0-10
PAIN_FUNCTIONAL_ASSESSMENT: UNABLE TO SELF-REPORT
PAIN_FUNCTIONAL_ASSESSMENT: 0-10
PAIN_FUNCTIONAL_ASSESSMENT: 0-10

## 2025-08-14 ASSESSMENT — PAIN SCALES - GENERAL
PAINLEVEL_OUTOF10: 2
PAINLEVEL_OUTOF10: 5 - MODERATE PAIN
PAINLEVEL_OUTOF10: 4
PAINLEVEL_OUTOF10: 7
PAINLEVEL_OUTOF10: 3
PAINLEVEL_OUTOF10: 6
PAINLEVEL_OUTOF10: 4
PAINLEVEL_OUTOF10: 7
PAINLEVEL_OUTOF10: 2
PAINLEVEL_OUTOF10: 0 - NO PAIN
PAINLEVEL_OUTOF10: 3

## 2025-08-14 ASSESSMENT — LIFESTYLE VARIABLES
HOW MANY STANDARD DRINKS CONTAINING ALCOHOL DO YOU HAVE ON A TYPICAL DAY: PATIENT DOES NOT DRINK
AUDIT-C TOTAL SCORE: 0
HOW OFTEN DO YOU HAVE A DRINK CONTAINING ALCOHOL: NEVER
HOW OFTEN DO YOU HAVE 6 OR MORE DRINKS ON ONE OCCASION: NEVER
AUDIT-C TOTAL SCORE: 0
SKIP TO QUESTIONS 9-10: 1

## 2025-08-14 ASSESSMENT — PATIENT HEALTH QUESTIONNAIRE - PHQ9
2. FEELING DOWN, DEPRESSED OR HOPELESS: NOT AT ALL
SUM OF ALL RESPONSES TO PHQ9 QUESTIONS 1 & 2: 0
1. LITTLE INTEREST OR PLEASURE IN DOING THINGS: NOT AT ALL

## 2025-08-15 VITALS
RESPIRATION RATE: 17 BRPM | OXYGEN SATURATION: 96 % | SYSTOLIC BLOOD PRESSURE: 118 MMHG | WEIGHT: 255 LBS | HEIGHT: 67 IN | DIASTOLIC BLOOD PRESSURE: 68 MMHG | TEMPERATURE: 97.9 F | BODY MASS INDEX: 40.02 KG/M2 | HEART RATE: 75 BPM

## 2025-08-15 PROBLEM — J35.1 TONSILLAR HYPERTROPHY: Status: RESOLVED | Noted: 2024-02-07 | Resolved: 2025-08-15

## 2025-08-15 PROCEDURE — 2500000001 HC RX 250 WO HCPCS SELF ADMINISTERED DRUGS (ALT 637 FOR MEDICARE OP): Performed by: OTOLARYNGOLOGY

## 2025-08-15 PROCEDURE — 7100000011 HC EXTENDED STAY RECOVERY HOURLY - NURSING UNIT

## 2025-08-15 RX ORDER — OXYCODONE HYDROCHLORIDE 5 MG/1
5 TABLET ORAL EVERY 6 HOURS PRN
Qty: 15 TABLET | Refills: 0 | Status: SHIPPED | OUTPATIENT
Start: 2025-08-15

## 2025-08-15 RX ADMIN — ACETAMINOPHEN 650 MG: 325 TABLET ORAL at 06:31

## 2025-08-15 ASSESSMENT — PAIN SCALES - GENERAL: PAINLEVEL_OUTOF10: 3

## 2025-08-15 ASSESSMENT — PAIN - FUNCTIONAL ASSESSMENT: PAIN_FUNCTIONAL_ASSESSMENT: 0-10

## 2025-08-19 ENCOUNTER — TELEPHONE (OUTPATIENT)
Dept: OTOLARYNGOLOGY | Facility: CLINIC | Age: 30
End: 2025-08-19
Payer: MEDICARE

## 2025-08-19 DIAGNOSIS — G89.18 POST-OPERATIVE PAIN: Primary | ICD-10-CM

## 2025-08-19 RX ORDER — HYDROCODONE BITARTRATE AND ACETAMINOPHEN 5; 325 MG/1; MG/1
1 TABLET ORAL EVERY 6 HOURS PRN
Qty: 15 TABLET | Refills: 0 | Status: SHIPPED | OUTPATIENT
Start: 2025-08-19 | End: 2025-08-26

## 2025-08-20 LAB
LABORATORY COMMENT REPORT: NORMAL
PATH REPORT.FINAL DX SPEC: NORMAL
PATH REPORT.GROSS SPEC: NORMAL
PATH REPORT.RELEVANT HX SPEC: NORMAL
PATH REPORT.TOTAL CANCER: NORMAL

## 2025-09-10 ENCOUNTER — APPOINTMENT (OUTPATIENT)
Dept: SLEEP MEDICINE | Facility: CLINIC | Age: 30
End: 2025-09-10
Payer: MEDICARE

## 2025-09-12 ENCOUNTER — APPOINTMENT (OUTPATIENT)
Dept: OTOLARYNGOLOGY | Facility: CLINIC | Age: 30
End: 2025-09-12
Payer: COMMERCIAL

## 2025-09-15 ENCOUNTER — APPOINTMENT (OUTPATIENT)
Dept: OTOLARYNGOLOGY | Facility: CLINIC | Age: 30
End: 2025-09-15
Payer: COMMERCIAL

## (undated) DEVICE — APPLICATOR, ENDOSCOPIC, F/SURGICEL POWDER

## (undated) DEVICE — DISSECTOR, LAPAROSCOPIC, 5MM BLUNT TIP

## (undated) DEVICE — DRAPE, SHEET, UTILITY, NON ABSORBENT, 18 X 26 IN, LF

## (undated) DEVICE — GLOVE, SURGICAL, PROTEXIS PI , 7.5, PF, LF

## (undated) DEVICE — CARE KIT, LAPAROSCOPIC, ADVANCED

## (undated) DEVICE — TROCAR SYSTEM, BALLOON, KII GELPORT, 12 X 100MM

## (undated) DEVICE — CAUTERY, PENCIL, PUSH BUTTON, SMOKE EVAC, 70MM

## (undated) DEVICE — SLEEVE, VASO PRESS, CALF GARMENT, MEDIUM, GREEN

## (undated) DEVICE — Device

## (undated) DEVICE — GOWN, SURGICAL, SIRUS, NON REINFORCED, LARGE

## (undated) DEVICE — HEMOSTAT, SURGICEL POWDER 3.0GRAMS

## (undated) DEVICE — CLIP, ENDO APPLIER LIGAMAX 5MM

## (undated) DEVICE — STRIP, SKIN CLOSURE, STERI STRIP, REINFORCED, 0.5 X 4 IN

## (undated) DEVICE — BLADE, SURGICAL, POLYMER COATED P15, STERILE, DISP

## (undated) DEVICE — ELECTRODE, ELECTROSURGICAL, BLADE, INSULATED, ENT/IMA, STERILE

## (undated) DEVICE — SUTURE, VICRYL, 0, 27 IN, UR-6, VIOLET

## (undated) DEVICE — GLOVE, SURGICAL, PROTEXIS PI , 6.5, PF, LF

## (undated) DEVICE — TUBE SET, PNEUMOCLEAR, SMOKE EVACU, HIGH-FLOW

## (undated) DEVICE — STRIP, SKIN CLOSURE, COMPOUND BENZION TINCTURE 0.6ML

## (undated) DEVICE — SUTURE, ETHILON, 3-0, 18 IN, PS1, BLACK

## (undated) DEVICE — CORD, MONOPOLAR, HIGH FREQUENCY, W/8MM PLUG F/VALLEYLAB, 8FT/244CM, STRL

## (undated) DEVICE — SOLUTION, IRRIGATION, X RX SODIUM CHL 0.9%, 1000ML BTL

## (undated) DEVICE — TUBING, SUCTION, 6MM X 10, CLEAN N-COND

## (undated) DEVICE — SUTURE, VICRYL, 3-0, 27 IN, CT-2, UNDYED

## (undated) DEVICE — IRRIGATOR, WOUND, HYDRO SURG PLUS, W/O TIP, DISP

## (undated) DEVICE — GOWN, SURGICAL, SMARTGOWN, XLARGE, STERILE

## (undated) DEVICE — RETRIEVAL SYSTEM, MONARCH, 10MM DISP ENDOSCOPIC

## (undated) DEVICE — DRAIN, WOUND, FLAT, HUBLESS, FULL LENGTH PERFORATION, 7 MM X 20 CM

## (undated) DEVICE — TROCAR, OPTICAL BLADELESS 5MM X 100 W/ADVANCED FIXATION

## (undated) DEVICE — DEFOGGER, LIQUID SOLUTION, FOAM, ROUND BTL

## (undated) DEVICE — STOPCOCK, 4 WAY, LARGE BORE, ROTATING, LUER LOCK, MALE

## (undated) DEVICE — SPONGE, GAUZE, AVANT, STERILE, NONWOVEN, 4PLY, 4 X 4, STANDARD

## (undated) DEVICE — MARKING PEN, REGULAR, W/TUO RUL & LA

## (undated) DEVICE — TIP, SUCTION, YANKAUER, BULB, ADULT

## (undated) DEVICE — EVACUATOR, WOUND, SUCTION, CLOSED, JACKSON-PRATT, 100 CC, SILICONE

## (undated) DEVICE — DRAPE, TAPE, ORTHO

## (undated) DEVICE — SUTURE, VICRYL, 4-0, 18 IN, UNDYED BR PS-2

## (undated) DEVICE — SUTURE, VICRYL, 3-0, 27 IN, SH, VIOLET